# Patient Record
Sex: FEMALE | Race: WHITE | Employment: PART TIME | URBAN - METROPOLITAN AREA
[De-identification: names, ages, dates, MRNs, and addresses within clinical notes are randomized per-mention and may not be internally consistent; named-entity substitution may affect disease eponyms.]

---

## 2017-11-03 ENCOUNTER — OFFICE VISIT (OUTPATIENT)
Dept: FAMILY MEDICINE CLINIC | Facility: CLINIC | Age: 47
End: 2017-11-03

## 2017-11-03 ENCOUNTER — LAB ENCOUNTER (OUTPATIENT)
Dept: LAB | Age: 47
End: 2017-11-03
Attending: FAMILY MEDICINE
Payer: COMMERCIAL

## 2017-11-03 VITALS
TEMPERATURE: 98 F | DIASTOLIC BLOOD PRESSURE: 76 MMHG | OXYGEN SATURATION: 98 % | SYSTOLIC BLOOD PRESSURE: 110 MMHG | HEIGHT: 63 IN | HEART RATE: 83 BPM | WEIGHT: 150 LBS | BODY MASS INDEX: 26.58 KG/M2

## 2017-11-03 DIAGNOSIS — Z00.00 ROUTINE MEDICAL EXAM: Primary | ICD-10-CM

## 2017-11-03 DIAGNOSIS — Z00.00 ROUTINE MEDICAL EXAM: ICD-10-CM

## 2017-11-03 DIAGNOSIS — K21.9 GASTROESOPHAGEAL REFLUX DISEASE WITHOUT ESOPHAGITIS: ICD-10-CM

## 2017-11-03 DIAGNOSIS — L70.0 ACNE VULGARIS: ICD-10-CM

## 2017-11-03 PROCEDURE — 85025 COMPLETE CBC W/AUTO DIFF WBC: CPT

## 2017-11-03 PROCEDURE — 90471 IMMUNIZATION ADMIN: CPT | Performed by: FAMILY MEDICINE

## 2017-11-03 PROCEDURE — 84443 ASSAY THYROID STIM HORMONE: CPT

## 2017-11-03 PROCEDURE — 99396 PREV VISIT EST AGE 40-64: CPT | Performed by: FAMILY MEDICINE

## 2017-11-03 PROCEDURE — 90715 TDAP VACCINE 7 YRS/> IM: CPT | Performed by: FAMILY MEDICINE

## 2017-11-03 PROCEDURE — 80061 LIPID PANEL: CPT

## 2017-11-03 PROCEDURE — 80053 COMPREHEN METABOLIC PANEL: CPT

## 2017-11-03 PROCEDURE — 36415 COLL VENOUS BLD VENIPUNCTURE: CPT

## 2017-11-03 RX ORDER — DOXYCYCLINE HYCLATE 50 MG/1
1 TABLET, FILM COATED ORAL 2 TIMES DAILY
Qty: 180 TABLET | Refills: 1 | Status: SHIPPED | OUTPATIENT
Start: 2017-11-03 | End: 2018-06-21 | Stop reason: ALTCHOICE

## 2017-11-03 RX ORDER — ESOMEPRAZOLE MAGNESIUM 20 MG/1
20 FOR SUSPENSION ORAL
Qty: 90 EACH | Refills: 1 | Status: SHIPPED | OUTPATIENT
Start: 2017-11-03 | End: 2017-11-08 | Stop reason: CLARIF

## 2017-11-03 RX ORDER — CLINDAMYCIN PHOSPHATE 10 MG/G
1 GEL TOPICAL 2 TIMES DAILY
Qty: 60 G | Refills: 2 | Status: SHIPPED | OUTPATIENT
Start: 2017-11-03 | End: 2018-06-21 | Stop reason: ALTCHOICE

## 2017-11-03 NOTE — H&P
HPI:   Patient presents with:  Physical  Acne  Gastro-esophageal Reflux      Mark Dorado is a 49year old female who presents for a complete physical exam without pap/gyne exam.     Patient has new complaints of:  · GERD, patient taking Nexium 20 mg Outpatient Prescriptions:  Clindamycin Phosphate 1 % External Gel Apply 1 Application topically 2 (two) times daily.  Disp: 60 g Rfl: 2   Esomeprazole Magnesium 20 MG Oral Powd Pack Take 20 mg by mouth every morning before breakfast. Disp: 90 each Rfl: 1 REVIEW OF SYSTEMS:   Pertinent positives and negatives noted in the the HPI.  Specifically:  GEN:  No fever or fatigue, no weight gain/loss  HEAD:  No headaches, no dizziness  EYES:  No vision change or complaints  EARS:  No hearing loss, no ear p LE, no tremor B UE, DTR's 2/4 B LE, gait WNL     ASSESSMENT AND PLAN:   1. Ellouise Sandhoff is a 49year old female who presents for a complete physical exam.  She is in good health.   Her weight is stable, Estimated body mass index is 26.57 kg/m² as calcul Dispense: 180 tablet; Refill: 01    3.  Gastroesophageal reflux disease without esophagitis  Improved, will continue present management, Nexium 20 mg once a day as needed only, patient to moderate diet, follow-up 6-12 months  - Esomeprazole Magnesium 20 MG

## 2017-11-06 DIAGNOSIS — R79.89 ABNORMAL BILIRUBIN TEST: Primary | ICD-10-CM

## 2017-11-07 ENCOUNTER — TELEPHONE (OUTPATIENT)
Dept: FAMILY MEDICINE CLINIC | Facility: CLINIC | Age: 47
End: 2017-11-07

## 2017-11-08 PROBLEM — R23.8 INTRINSIC AGING OF FACIAL SKIN: Status: ACTIVE | Noted: 2017-11-08

## 2017-11-08 RX ORDER — OMEPRAZOLE 40 MG/1
40 CAPSULE, DELAYED RELEASE ORAL DAILY
Qty: 90 CAPSULE | Refills: 0 | Status: SHIPPED | OUTPATIENT
Start: 2017-11-08 | End: 2018-12-05 | Stop reason: ALTCHOICE

## 2017-11-08 NOTE — TELEPHONE ENCOUNTER
Dr koch it looks like that you sent Esomeprazole Magnesium 20 MG Oral Powd Pack it was not pills that why the insurance didn't coved.  Can you sent Esomeprazole tablet per pt

## 2017-11-10 ENCOUNTER — OFFICE VISIT (OUTPATIENT)
Dept: FAMILY MEDICINE CLINIC | Facility: CLINIC | Age: 47
End: 2017-11-10

## 2017-11-10 VITALS
HEIGHT: 63 IN | HEART RATE: 73 BPM | SYSTOLIC BLOOD PRESSURE: 114 MMHG | BODY MASS INDEX: 26.58 KG/M2 | DIASTOLIC BLOOD PRESSURE: 72 MMHG | WEIGHT: 150 LBS | OXYGEN SATURATION: 98 % | TEMPERATURE: 99 F

## 2017-11-10 DIAGNOSIS — R23.8 INTRINSIC AGING OF FACIAL SKIN: Primary | ICD-10-CM

## 2017-11-10 RX ORDER — DOXYCYCLINE HYCLATE 50 MG/1
CAPSULE ORAL
COMMUNITY
Start: 2017-11-06 | End: 2018-06-21 | Stop reason: ALTCHOICE

## 2017-11-10 NOTE — PROCEDURES
HPI:   Patient presents with:  Derm Problem: botox and  Jonathan is a 49year old female who presents for Cosmetic facila Botox and Microdermabrasion (#1) for normal skin aging effects.     She is without other new complaints REVIEW OF SYSTEMS:   Pertinent positives and negatives noted in the the HPI.  Specifically:  GEN:  No fever or fatigue  HEAD:  No headaches, no dizziness  EYES:  No vision change or complaints  EARS:  No hearing loss, no ear pain  MOUTH/THROAT: to the plan.)     Additional Assessment and Plan:  1. Intrinsic aging of facial skin  See above      The patient indicates understanding of the above recommendations and agrees to the above plan.   Follow up: as above    No orders of the defined types were

## 2017-12-18 PROCEDURE — 88175 CYTOPATH C/V AUTO FLUID REDO: CPT | Performed by: OBSTETRICS & GYNECOLOGY

## 2018-03-23 ENCOUNTER — OFFICE VISIT (OUTPATIENT)
Dept: FAMILY MEDICINE CLINIC | Facility: CLINIC | Age: 48
End: 2018-03-23

## 2018-03-23 ENCOUNTER — MED REC SCAN ONLY (OUTPATIENT)
Dept: FAMILY MEDICINE CLINIC | Facility: CLINIC | Age: 48
End: 2018-03-23

## 2018-03-23 VITALS
OXYGEN SATURATION: 96 % | BODY MASS INDEX: 28.35 KG/M2 | DIASTOLIC BLOOD PRESSURE: 70 MMHG | TEMPERATURE: 98 F | HEART RATE: 105 BPM | SYSTOLIC BLOOD PRESSURE: 110 MMHG | HEIGHT: 63 IN | WEIGHT: 160 LBS

## 2018-03-23 DIAGNOSIS — R23.8 INTRINSIC AGING OF FACIAL SKIN: Primary | ICD-10-CM

## 2018-03-23 NOTE — PROCEDURES
HPI:   Patient presents with:  Derm Problem: botox & Microderm. #2       Wilton Burns is a 51year old female who presents for cosmetic facial BOTOX injections for normal skin aging effects. She is without other complaints and concerns.   See MO meza to visit. No past medical history on file. No Known Allergies    REVIEW OF SYSTEMS:   Pertinent positives and negatives noted in the the HPI.  Specifically:  GEN:  No fever or fatigue  HEAD:  No headaches, no dizziness  EYES:  No vision change or complai given to patient. Recommend to return to clinic in 3-4 months or as desired.   [employer] skin care products purchased today: none    (The patient's consent obtained: she indicates understanding of the risks and agrees to the plan above.)     Additional A

## 2018-09-05 ENCOUNTER — OFFICE VISIT (OUTPATIENT)
Dept: FAMILY MEDICINE CLINIC | Facility: CLINIC | Age: 48
End: 2018-09-05

## 2018-09-05 ENCOUNTER — OFFICE VISIT (OUTPATIENT)
Dept: FAMILY MEDICINE CLINIC | Facility: CLINIC | Age: 48
End: 2018-09-05
Payer: COMMERCIAL

## 2018-09-05 VITALS
WEIGHT: 155 LBS | BODY MASS INDEX: 27.46 KG/M2 | HEIGHT: 63 IN | DIASTOLIC BLOOD PRESSURE: 78 MMHG | OXYGEN SATURATION: 99 % | SYSTOLIC BLOOD PRESSURE: 112 MMHG | RESPIRATION RATE: 16 BRPM | HEART RATE: 66 BPM

## 2018-09-05 VITALS
BODY MASS INDEX: 27.46 KG/M2 | HEIGHT: 63 IN | WEIGHT: 155 LBS | DIASTOLIC BLOOD PRESSURE: 78 MMHG | SYSTOLIC BLOOD PRESSURE: 112 MMHG | HEART RATE: 66 BPM

## 2018-09-05 DIAGNOSIS — L57.0 AK (ACTINIC KERATOSIS): Primary | ICD-10-CM
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
## 2018-09-05 DIAGNOSIS — R23.8 INTRINSIC AGING OF FACIAL SKIN: ICD-10-CM

## 2018-09-05 PROCEDURE — 17000 DESTRUCT PREMALG LESION: CPT | Performed by: FAMILY MEDICINE

## 2018-09-05 PROCEDURE — 17003 DESTRUCT PREMALG LES 2-14: CPT | Performed by: FAMILY MEDICINE

## 2018-09-05 RX ORDER — FLUOROURACIL 50 MG/G
1 CREAM TOPICAL 2 TIMES DAILY
Qty: 1 TUBE | Refills: 1 | Status: SHIPPED | OUTPATIENT
Start: 2018-09-05 | End: 2020-06-25 | Stop reason: CLARIF

## 2018-09-05 NOTE — PROCEDURES
HPI:  Patient presents with:  Derm Problem: Cryo-face skin lesions      Linda Regan is a 51year old female who presents for skin lesion(s). Located on face. Has had for years  It is described as worsening, not healing, flaking.  The probable cause and upper lip bilaterally,  skin color wnl, otherwise normal signs of skin aging, no atypical nevi noted  HEENT: atraumatic, normocephalic, EYES:PERRLA, EOMI ,conjunctiva are clear, no discharge  NECK: supple  EXT: no edema  MS: grossly NL B UE/LE, no join Apply 1 Application topically 2 (two) times daily.  As needed to AA until desire affect acheived           Imaging & Consults:  None    Kerry Babin MD

## 2018-09-05 NOTE — PROCEDURES
HPI:   Patient presents with:  Derm Problem: Microdermabrasion #3, BOTOX       Dain Desai is a 51year old female who presents for cosmetic facial Microdermabrasion & BOTOX injections for normal skin aging effects.     She is without other complaints normocephalic  EYES:PERRLA, EOMI, conjunctiva are clear, no discharge  NECK: supple  EXT:no CCE  MS: grossly normal  B UE and LE  NEURO: A&O x 3, CN II-XII wnl, motor and sensory grossly wnl B UE and LE   PSYCH: mood and affect WNL,  speech clear and congr

## 2018-12-05 ENCOUNTER — OFFICE VISIT (OUTPATIENT)
Dept: FAMILY MEDICINE CLINIC | Facility: CLINIC | Age: 48
End: 2018-12-05

## 2018-12-05 ENCOUNTER — OFFICE VISIT (OUTPATIENT)
Dept: FAMILY MEDICINE CLINIC | Facility: CLINIC | Age: 48
End: 2018-12-05
Payer: COMMERCIAL

## 2018-12-05 VITALS
OXYGEN SATURATION: 98 % | HEART RATE: 70 BPM | SYSTOLIC BLOOD PRESSURE: 110 MMHG | WEIGHT: 155 LBS | BODY MASS INDEX: 27.46 KG/M2 | HEIGHT: 63 IN | DIASTOLIC BLOOD PRESSURE: 64 MMHG | RESPIRATION RATE: 17 BRPM

## 2018-12-05 VITALS
DIASTOLIC BLOOD PRESSURE: 64 MMHG | RESPIRATION RATE: 17 BRPM | OXYGEN SATURATION: 98 % | BODY MASS INDEX: 27.46 KG/M2 | HEIGHT: 63 IN | SYSTOLIC BLOOD PRESSURE: 110 MMHG | HEART RATE: 70 BPM | WEIGHT: 155 LBS
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
## 2018-12-05 DIAGNOSIS — L57.0 AK (ACTINIC KERATOSIS): Primary | ICD-10-CM

## 2018-12-05 DIAGNOSIS — R23.8 INTRINSIC AGING OF FACIAL SKIN: Primary | ICD-10-CM

## 2018-12-05 PROCEDURE — 17000 DESTRUCT PREMALG LESION: CPT | Performed by: FAMILY MEDICINE

## 2018-12-05 PROCEDURE — 17003 DESTRUCT PREMALG LES 2-14: CPT | Performed by: FAMILY MEDICINE

## 2018-12-05 RX ORDER — CETIRIZINE HYDROCHLORIDE 10 MG/1
10 TABLET ORAL DAILY
COMMUNITY
End: 2019-04-17 | Stop reason: ALTCHOICE

## 2018-12-06 NOTE — PROCEDURES
HPI:   Patient presents with:  Procedure: Microderm #4, Botox       Priyanka Herron is a 51year old female who presents for cosmetic facial microdermabrasion #4  And BOTOX injections for normal skin aging effects.     She is without other complaints and c wnl, Normal signs of skin aging  HEENT: atraumatic, normocephalic  EYES:PERRLA, EOMI, conjunctiva are clear, no discharge  NECK: supple  EXT:no CCE  MS: grossly normal  B UE and LE  NEURO: A&O x 3, CN II-XII wnl, motor and sensory grossly wnl B UE and LE

## 2018-12-06 NOTE — PROCEDURES
HPI:  Patient presents with:  Derm Problem  Follow - Up      Griselda Vergara is a 51year old female who presents for skin lesion(s). Located on face: . Has had for months-year  It is described as ***. The probable cause is unknown***.  Has tried *** wi and overall skin color wnl  HEENT: atraumatic, normocephalic, EYES:PERRLA, EOMI ,conjunctiva are clear, no discharge  NECK: supple***  EXT: no edema  MS: grossly NL B UE/LE, no joint deformities, FROM B UE/LE  PSYCH: mood and affect WNL, speech clear, mood

## 2018-12-09 NOTE — PROCEDURES
HPI:  Patient presents with:  Derm Problem  Follow - Up      Lilly Naidu is a 51year old female who presents for skin lesion(s). Located on face.  Has had for years  It is described as overall improving but a few persistent despite cryotherapy, the well developed, well nourished, female in no apparent distress  SKIN: Few scattered slightly raised pink dry flaky patches especially R temple area (2), R lat forehead (1) L maxillary (3) and infraorbital area (1),  skin color wnl, otherwise normal signs o clinic if signs of infection or persistent bleeding      No orders of the defined types were placed in this encounter.       Meds & Refills for this Visit:  Requested Prescriptions      No prescriptions requested or ordered in this encounter       Imaging &

## 2019-01-15 ENCOUNTER — TELEPHONE (OUTPATIENT)
Dept: FAMILY MEDICINE CLINIC | Facility: CLINIC | Age: 49
End: 2019-01-15

## 2019-01-16 NOTE — TELEPHONE ENCOUNTER
Call pt-LHK the Skinscripts order is in and the Hydrating moisturizer is available, if she wants anything else we can put it aside too (check w Alicia Chowdhury as someone paid for Hydrating moisturizer and ageless serum-both are put aside in Michelle's office, was it T

## 2019-04-17 ENCOUNTER — LAB ENCOUNTER (OUTPATIENT)
Dept: LAB | Age: 49
End: 2019-04-17
Attending: FAMILY MEDICINE
Payer: COMMERCIAL

## 2019-04-17 ENCOUNTER — OFFICE VISIT (OUTPATIENT)
Dept: FAMILY MEDICINE CLINIC | Facility: CLINIC | Age: 49
End: 2019-04-17
Payer: COMMERCIAL

## 2019-04-17 ENCOUNTER — OFFICE VISIT (OUTPATIENT)
Dept: FAMILY MEDICINE CLINIC | Facility: CLINIC | Age: 49
End: 2019-04-17

## 2019-04-17 VITALS
TEMPERATURE: 99 F | DIASTOLIC BLOOD PRESSURE: 72 MMHG | OXYGEN SATURATION: 98 % | SYSTOLIC BLOOD PRESSURE: 110 MMHG | BODY MASS INDEX: 27.65 KG/M2 | RESPIRATION RATE: 17 BRPM | HEART RATE: 82 BPM | WEIGHT: 158 LBS | HEIGHT: 63.5 IN
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
## 2019-04-17 DIAGNOSIS — K59.00 CONSTIPATION, UNSPECIFIED CONSTIPATION TYPE: ICD-10-CM

## 2019-04-17 DIAGNOSIS — L57.0 AK (ACTINIC KERATOSIS): ICD-10-CM

## 2019-04-17 DIAGNOSIS — Z00.00 ROUTINE MEDICAL EXAM: Primary | ICD-10-CM

## 2019-04-17 DIAGNOSIS — R68.89 FORGETFULNESS: ICD-10-CM

## 2019-04-17 DIAGNOSIS — H93.13 TINNITUS OF BOTH EARS: ICD-10-CM

## 2019-04-17 DIAGNOSIS — Z13.820 SCREENING FOR OSTEOPOROSIS: ICD-10-CM

## 2019-04-17 DIAGNOSIS — Z00.00 ROUTINE MEDICAL EXAM: ICD-10-CM

## 2019-04-17 DIAGNOSIS — R23.8 INTRINSIC AGING OF FACIAL SKIN: Primary | ICD-10-CM

## 2019-04-17 DIAGNOSIS — Z12.11 SCREENING FOR COLON CANCER: ICD-10-CM

## 2019-04-17 PROCEDURE — 84443 ASSAY THYROID STIM HORMONE: CPT

## 2019-04-17 PROCEDURE — 17003 DESTRUCT PREMALG LES 2-14: CPT | Performed by: FAMILY MEDICINE

## 2019-04-17 PROCEDURE — 99396 PREV VISIT EST AGE 40-64: CPT | Performed by: FAMILY MEDICINE

## 2019-04-17 PROCEDURE — 17000 DESTRUCT PREMALG LESION: CPT | Performed by: FAMILY MEDICINE

## 2019-04-17 PROCEDURE — 80053 COMPREHEN METABOLIC PANEL: CPT

## 2019-04-17 PROCEDURE — 36415 COLL VENOUS BLD VENIPUNCTURE: CPT

## 2019-04-17 PROCEDURE — 99213 OFFICE O/P EST LOW 20 MIN: CPT | Performed by: FAMILY MEDICINE

## 2019-04-17 PROCEDURE — 80061 LIPID PANEL: CPT

## 2019-04-17 PROCEDURE — 84436 ASSAY OF TOTAL THYROXINE: CPT

## 2019-04-17 PROCEDURE — 85025 COMPLETE CBC W/AUTO DIFF WBC: CPT

## 2019-04-18 NOTE — PROCEDURES
ASSESSMENT/PLAN:  Fabiola Vasquez is a 47year old female who presents for treatment of skin lesion    1. AK (actinic keratosis)  Cryo performed as below  F/u 3-4 months or sooner prn    Procedure: Destruction of skin lesion(s): Rosebud Galea    Locations:  Face, 5 to

## 2019-04-18 NOTE — H&P
HPI:   Patient presents with:  Physical  Memory Loss  Tinnitus  Derm Problem: ak face  Constipation      Erin Cuenca is a 47year old female who presents for a complete physical exam without pap/gyne exam.     Patient has new complaints of:  Constip 04/17/2019 01:00 PM    BILT 1.2 04/17/2019 01:00 PM    TSH 2.520 04/17/2019 01:00 PM        Lab Results   Component Value Date/Time    CHOLEST 167 04/17/2019 01:00 PM    HDL 45 04/17/2019 01:00 PM    TRIG 80 04/17/2019 01:00 PM     (H) 04/17/2019 01 gain/loss  HEAD:  No headaches, no dizziness  EYES:  No vision change or complaints  EARS:  No hearing loss, no ear pain  MOUTH/THROAT:  No sore throat or dental problems, no oral lesions  HEART:  No chest pain or palpitations  LUNG:  No SOB, cough or whee old female who presents for a complete physical exam.  She is in good health. Her weight is stable, Estimated body mass index is 27.55 kg/m² as calculated from the following:    Height as of an earlier encounter on 4/17/19: 63.5\".     Weight as of an lesly no txt, rec second opinion w Neuro as well  - NEURO - INTERNAL    7. AK (actinic keratosis)-face, recurrent  See procedure note      The patient indicates understanding of the above recommendations and agrees to the above plan.   Follow up: as above    Orde

## 2019-04-18 NOTE — PROCEDURES
HPI:   Patient presents with:  Derm Problem: Microdermabrasion #2, botox       Virgil Ellington is a 47year old female who presents for cosmetic facial microdermabrasion #2 & BOTOX injections for normal skin aging effects.     She is without other complain discharge  NECK: supple  EXT:no CCE  MS: grossly normal  B UE and LE  NEURO: A&O x 3, CN II-XII wnl, motor and sensory grossly wnl B UE and LE   PSYCH: mood and affect WNL,  speech clear and congruent with thoughts    ASSESSMENT AND PLAN:   1.  Patient is a

## 2019-05-17 ENCOUNTER — OFFICE VISIT (OUTPATIENT)
Dept: FAMILY MEDICINE CLINIC | Facility: CLINIC | Age: 49
End: 2019-05-17
Payer: COMMERCIAL

## 2019-05-17 VITALS
HEART RATE: 78 BPM | HEIGHT: 63 IN | WEIGHT: 160 LBS | SYSTOLIC BLOOD PRESSURE: 120 MMHG | DIASTOLIC BLOOD PRESSURE: 70 MMHG | RESPIRATION RATE: 17 BRPM | BODY MASS INDEX: 28.35 KG/M2 | TEMPERATURE: 98 F

## 2019-05-17 DIAGNOSIS — R10.30 LOWER ABDOMINAL PAIN: Primary | ICD-10-CM

## 2019-05-17 DIAGNOSIS — M54.50 ACUTE BILATERAL LOW BACK PAIN WITHOUT SCIATICA: ICD-10-CM

## 2019-05-17 DIAGNOSIS — R35.0 URINARY FREQUENCY: ICD-10-CM

## 2019-05-17 DIAGNOSIS — Z84.1 FAMILY HISTORY OF KIDNEY STONE: ICD-10-CM

## 2019-05-17 DIAGNOSIS — Z87.42 HISTORY OF ENDOMETRIOSIS: ICD-10-CM

## 2019-05-17 PROCEDURE — 99214 OFFICE O/P EST MOD 30 MIN: CPT | Performed by: FAMILY MEDICINE

## 2019-05-17 PROCEDURE — 87086 URINE CULTURE/COLONY COUNT: CPT | Performed by: FAMILY MEDICINE

## 2019-05-17 PROCEDURE — 81003 URINALYSIS AUTO W/O SCOPE: CPT | Performed by: FAMILY MEDICINE

## 2019-05-17 NOTE — PROGRESS NOTES
HPI:   Patient presents with:  Abdominal Pain: ant pevlic pressure, started 2 weeks ago, precribed macrobid 5/7/2019 x 5 days  Low Back Pain: family h/o kidney stones  Urinary Frequency      Cheli Hutchison is a 47year old female who presents with comp Apply 1 Application topically 2 (two) times daily. As needed to AA until desire affect acheived Disp: 1 Tube Rfl: 1   Fluticasone Propionate (FLONASE) 50 MCG/ACT Nasal Suspension by Each Nare route daily. Disp:  Rfl:       History reviewed.  No pertinent pa atraumatic, normocephalic, nose and throat are clear; EYES: PERRLA, EOMI, conjunctiva are clear, no discharge  NECK: supple, no LAD, no TM  LUNGS: good BS B, clear to auscultation B, no wheezing and no rhonchi B   CARDIO: RRR without murmur, NL S1 S2, no S Cefaclor 500 MG Oral Cap; Take 1 capsule (500 mg total) by mouth 2 (two) times daily. X 7-10 days  Dispense: 20 capsule; Refill: 0  - US PELVIS (TRANSABDOMINAL AND TRANSVAGINAL) (CPT=76856/05186);  Future  - URINE CULTURE, ROUTINE  - US ABDOMEN COMPLETE (CP

## 2019-05-21 ENCOUNTER — HOSPITAL ENCOUNTER (OUTPATIENT)
Dept: ULTRASOUND IMAGING | Age: 49
Discharge: HOME | End: 2019-05-21
Attending: FAMILY MEDICINE
Payer: COMMERCIAL

## 2019-05-21 DIAGNOSIS — R35.0 URINARY FREQUENCY: ICD-10-CM

## 2019-05-21 DIAGNOSIS — Z87.42 HISTORY OF ENDOMETRIOSIS: ICD-10-CM

## 2019-05-21 DIAGNOSIS — M54.50 ACUTE BILATERAL LOW BACK PAIN WITHOUT SCIATICA: ICD-10-CM

## 2019-05-21 DIAGNOSIS — R10.30 LOWER ABDOMINAL PAIN: ICD-10-CM

## 2019-05-21 PROCEDURE — 76830 TRANSVAGINAL US NON-OB: CPT | Performed by: FAMILY MEDICINE

## 2019-05-21 PROCEDURE — 76856 US EXAM PELVIC COMPLETE: CPT | Performed by: FAMILY MEDICINE

## 2019-05-24 ENCOUNTER — HOSPITAL ENCOUNTER (OUTPATIENT)
Dept: ULTRASOUND IMAGING | Age: 49
Discharge: HOME | End: 2019-05-24
Attending: FAMILY MEDICINE
Payer: COMMERCIAL

## 2019-05-24 DIAGNOSIS — R10.30 LOWER ABDOMINAL PAIN: ICD-10-CM

## 2019-05-24 DIAGNOSIS — Z84.1 FAMILY HISTORY OF KIDNEY STONE: ICD-10-CM

## 2019-05-24 DIAGNOSIS — M54.50 ACUTE BILATERAL LOW BACK PAIN WITHOUT SCIATICA: ICD-10-CM

## 2019-05-24 DIAGNOSIS — R35.0 URINARY FREQUENCY: ICD-10-CM

## 2019-05-24 PROCEDURE — 76700 US EXAM ABDOM COMPLETE: CPT | Performed by: FAMILY MEDICINE

## 2019-05-28 ENCOUNTER — TELEPHONE (OUTPATIENT)
Dept: FAMILY MEDICINE CLINIC | Facility: CLINIC | Age: 49
End: 2019-05-28

## 2019-05-28 NOTE — TELEPHONE ENCOUNTER
Pain in upper left back and left ribs worse than before should she start antiobiotics that Dr Richard Rivero prescribed.

## 2019-05-28 NOTE — TELEPHONE ENCOUNTER
Spoke to pt, per US result notes pt is to return in 1-2 weeks if no improvement. Pt instructed to schedule appt for f/u; appt scheduled 5/29/19 with ANGELINA. Pt states she p/u Cefaclor from pharm today, which she states was being held if urine cx showed anything abnormal. Pt wanting to know if she should start the Cefaclor or wait until f/u tomorrow. OK to send [employer] message with answer.

## 2019-05-29 ENCOUNTER — HOSPITAL ENCOUNTER (OUTPATIENT)
Dept: GENERAL RADIOLOGY | Age: 49
Discharge: HOME | End: 2019-05-29
Attending: FAMILY MEDICINE
Payer: COMMERCIAL

## 2019-05-29 ENCOUNTER — OFFICE VISIT (OUTPATIENT)
Dept: FAMILY MEDICINE CLINIC | Facility: CLINIC | Age: 49
End: 2019-05-29
Payer: COMMERCIAL

## 2019-05-29 VITALS
TEMPERATURE: 99 F | RESPIRATION RATE: 17 BRPM | HEART RATE: 68 BPM | SYSTOLIC BLOOD PRESSURE: 112 MMHG | HEIGHT: 63 IN | OXYGEN SATURATION: 98 % | WEIGHT: 160 LBS | BODY MASS INDEX: 28.35 KG/M2 | DIASTOLIC BLOOD PRESSURE: 78 MMHG

## 2019-05-29 DIAGNOSIS — R10.12 LEFT UPPER QUADRANT PAIN: ICD-10-CM

## 2019-05-29 DIAGNOSIS — Z87.440 HISTORY OF UTI: ICD-10-CM

## 2019-05-29 DIAGNOSIS — K59.09 OTHER CONSTIPATION: ICD-10-CM

## 2019-05-29 DIAGNOSIS — R06.02 SHORTNESS OF BREATH: ICD-10-CM

## 2019-05-29 DIAGNOSIS — M54.6 ACUTE LEFT-SIDED THORACIC BACK PAIN: Primary | ICD-10-CM

## 2019-05-29 DIAGNOSIS — M54.6 ACUTE LEFT-SIDED THORACIC BACK PAIN: ICD-10-CM

## 2019-05-29 PROCEDURE — 72072 X-RAY EXAM THORAC SPINE 3VWS: CPT | Performed by: FAMILY MEDICINE

## 2019-05-29 PROCEDURE — 74018 RADEX ABDOMEN 1 VIEW: CPT | Performed by: FAMILY MEDICINE

## 2019-05-29 PROCEDURE — 99214 OFFICE O/P EST MOD 30 MIN: CPT | Performed by: FAMILY MEDICINE

## 2019-05-29 NOTE — PROGRESS NOTES
HPI:   Patient presents with:   Follow - Up: Pelvic and mid-lower L side pain, lov 5/17/19 for this problem, no improvement  Breathing Problem: recent SOB, today's peak flows: 330, 340, 350      Georgia Raz is a 47year old female who presents with c (two) times daily. As needed to AA until desire affect acheived Disp: 1 Tube Rfl: 1   Fluticasone Propionate (FLONASE) 50 MCG/ACT Nasal Suspension by Each Nare route daily. Disp:  Rfl:       History reviewed. No pertinent past medical history.    Past Surgi normocephalic, nose and throat are clear; EYES: PERRLA, EOMI, conjunctiva are clear, no discharge  NECK: supple, no LAD, no TM  LUNGS: good BS B, clear to auscultation B, no wheezing and no rhonchi B   CARDIO: RRR without murmur, NL S1 S2, no S3 S4  EXT: n without other UTI symptoms, will follow-up as above and as needed      The patient indicates understanding of the above recommendations and agrees to the above plan. Follow up: as above    No orders of the defined types were placed in this encounter.

## 2019-05-29 NOTE — TELEPHONE ENCOUNTER
Called patient, advised to start abx now, try to take 3 doses before apt tomorrow. Patient verbalizes understanding.

## 2019-06-17 ENCOUNTER — LAB ENCOUNTER (OUTPATIENT)
Dept: LAB | Facility: REFERENCE LAB | Age: 49
End: 2019-06-17
Attending: FAMILY MEDICINE
Payer: COMMERCIAL

## 2019-06-17 ENCOUNTER — OFFICE VISIT (OUTPATIENT)
Dept: FAMILY MEDICINE CLINIC | Facility: CLINIC | Age: 49
End: 2019-06-17
Payer: COMMERCIAL

## 2019-06-17 VITALS
OXYGEN SATURATION: 99 % | BODY MASS INDEX: 28 KG/M2 | SYSTOLIC BLOOD PRESSURE: 100 MMHG | RESPIRATION RATE: 18 BRPM | HEIGHT: 63 IN | WEIGHT: 158 LBS | TEMPERATURE: 98 F | HEART RATE: 99 BPM | DIASTOLIC BLOOD PRESSURE: 70 MMHG

## 2019-06-17 DIAGNOSIS — R10.9 ABDOMINAL PAIN, UNSPECIFIED ABDOMINAL LOCATION: ICD-10-CM

## 2019-06-17 DIAGNOSIS — R10.9 ABDOMINAL PAIN, UNSPECIFIED ABDOMINAL LOCATION: Primary | ICD-10-CM

## 2019-06-17 DIAGNOSIS — K59.00 CONSTIPATION, UNSPECIFIED CONSTIPATION TYPE: ICD-10-CM

## 2019-06-17 DIAGNOSIS — R12 CHRONIC HEARTBURN: ICD-10-CM

## 2019-06-17 PROCEDURE — 83690 ASSAY OF LIPASE: CPT

## 2019-06-17 PROCEDURE — 84443 ASSAY THYROID STIM HORMONE: CPT

## 2019-06-17 PROCEDURE — 80053 COMPREHEN METABOLIC PANEL: CPT

## 2019-06-17 PROCEDURE — 36415 COLL VENOUS BLD VENIPUNCTURE: CPT

## 2019-06-17 PROCEDURE — 99214 OFFICE O/P EST MOD 30 MIN: CPT | Performed by: FAMILY MEDICINE

## 2019-06-17 PROCEDURE — 85025 COMPLETE CBC W/AUTO DIFF WBC: CPT

## 2019-06-17 PROCEDURE — 87086 URINE CULTURE/COLONY COUNT: CPT

## 2019-06-17 PROCEDURE — 81001 URINALYSIS AUTO W/SCOPE: CPT

## 2019-06-17 PROCEDURE — 81015 MICROSCOPIC EXAM OF URINE: CPT

## 2019-06-17 NOTE — PATIENT INSTRUCTIONS
PLAN:  -check labs  -check urine  -CT abd/pelvis ordered  -stay well-hydrated  -low oxalate diet  -f/u after above completed/as needed

## 2019-06-17 NOTE — PROGRESS NOTES
HPI: 47year old female presents c/o Patient presents with:  Back Pain: left side, Dr sweet patient and has done ultrasound would like to discuss options, still feeling pain    VSS. C/o L flank pain below ribs and right under rib area. S/s x 3 weeks.  P fatigue, recent weight changes, rashes, lesions.  Further review is otherwise negative.     06/17/19  1245   BP: 100/70   Pulse: 99   Resp: 18   Temp: 98.1 °F (36.7 °C)   TempSrc: Oral   SpO2: 99%   Weight: 158 lb   Height: 63\"       Wt Readings from Last keratosis)     Excess sun exposure      PLAN:  -check labs  -check urine  -CT abd/pelvis ordered  -stay well-hydrated  -low oxalate diet  -f/u after above completed/as needed

## 2019-06-19 ENCOUNTER — TELEPHONE (OUTPATIENT)
Dept: FAMILY MEDICINE CLINIC | Facility: CLINIC | Age: 49
End: 2019-06-19

## 2019-06-19 ENCOUNTER — HOSPITAL ENCOUNTER (OUTPATIENT)
Dept: CT IMAGING | Age: 49
Discharge: HOME | End: 2019-06-19
Attending: FAMILY MEDICINE
Payer: COMMERCIAL

## 2019-06-19 DIAGNOSIS — R10.9 ABDOMINAL PAIN, UNSPECIFIED ABDOMINAL LOCATION: ICD-10-CM

## 2019-06-19 PROCEDURE — 74176 CT ABD & PELVIS W/O CONTRAST: CPT | Performed by: FAMILY MEDICINE

## 2019-06-19 NOTE — TELEPHONE ENCOUNTER
Notes recorded by Eligio Godinez DO on 6/19/2019 at 12:10 PM CDT  All labs/urine analysis are unremarkable     Holly Chaparro and gave her Dr Casillas's results. Patient acknowledged and understood.

## 2019-06-19 NOTE — TELEPHONE ENCOUNTER
Discussed recent labs/UA-all normal.    Discussed CT abd/pelvis: a few tiny nonobstructing L kidney stones are present-unlikely cause of symptoms (constant/dull); iliac artery atherosclerosis noted.      We discussed r/o MALS-however unlikely/rare, with a f

## 2019-06-20 ENCOUNTER — TELEPHONE (OUTPATIENT)
Dept: FAMILY MEDICINE CLINIC | Facility: CLINIC | Age: 49
End: 2019-06-20

## 2019-07-13 ENCOUNTER — HOSPITAL ENCOUNTER (OUTPATIENT)
Dept: ULTRASOUND IMAGING | Facility: HOSPITAL | Age: 49
Discharge: HOME | End: 2019-07-13
Attending: FAMILY MEDICINE
Payer: COMMERCIAL

## 2019-07-13 DIAGNOSIS — R93.5 ABNORMAL CT OF THE ABDOMEN: ICD-10-CM

## 2019-07-13 DIAGNOSIS — R10.9 ABDOMINAL PAIN, UNSPECIFIED ABDOMINAL LOCATION: ICD-10-CM

## 2019-07-13 PROCEDURE — 93975 VASCULAR STUDY: CPT | Performed by: FAMILY MEDICINE

## 2019-07-13 PROCEDURE — 76700 US EXAM ABDOM COMPLETE: CPT | Performed by: FAMILY MEDICINE

## 2019-07-15 ENCOUNTER — TELEPHONE (OUTPATIENT)
Dept: FAMILY MEDICINE CLINIC | Facility: CLINIC | Age: 49
End: 2019-07-15

## 2019-07-15 DIAGNOSIS — R10.9 ABDOMINAL PAIN, UNSPECIFIED ABDOMINAL LOCATION: Primary | ICD-10-CM

## 2019-07-15 NOTE — TELEPHONE ENCOUNTER
Spoke to pt, she states she is not interested in PT at this time and feels it is more GI related. Pt states she did speak with Dr. Doroteo Ramirez regarding a colonoscopy and decided to have the Cologuard done.  Pt states she was told it was to be ordered but never h

## 2019-07-15 NOTE — TELEPHONE ENCOUNTER
Ok-her symptoms are suspicious for musculoskeletal origin. Patient amenable to try physical therapy? Also, has she had a colonoscopy? This would r/o anything GI-related and they could potentially perform an EGD at the same time.

## 2019-07-15 NOTE — TELEPHONE ENCOUNTER
Spoke to pt, informed her the US Abdomen results were negative. Pt states the abdominal pain has improved in intensity (2-3 on scale from 0-10) and recurrences but has not subsided completely.  Pt informed staff will call back with any further recommendatio

## 2019-07-15 NOTE — TELEPHONE ENCOUNTER
Spoke to pt, she is interested in a referral to GI, preferably in the [facility] area. Pended referral to Dr. Celia Hearn visits, dx: abdominal pain, please change as needed.

## 2019-07-15 NOTE — TELEPHONE ENCOUNTER
We can do a Cologuard but this would not rule out inflammatory bowel disease or other GI-related process contributing to her symptoms. This is a colon cancer screening only. Pls confirm this is the route she wants to take.     I'd suggest seeing a GI doctor

## 2019-07-15 NOTE — TELEPHONE ENCOUNTER
----- Message from Lorenza Knox DO sent at 7/14/2019 10:33 PM CDT -----  Abd doppler US is NEGATIVE. There is no evidence of arterial or venous obstruction in the abdomen. How is patient doing?

## 2019-07-23 PROBLEM — Z91.89 AT HIGH RISK FOR BREAST CANCER: Status: ACTIVE | Noted: 2019-07-23

## 2019-08-09 PROCEDURE — 88360 TUMOR IMMUNOHISTOCHEM/MANUAL: CPT | Performed by: RADIOLOGY

## 2019-08-09 PROCEDURE — 88305 TISSUE EXAM BY PATHOLOGIST: CPT | Performed by: RADIOLOGY

## 2019-09-04 LAB — AMB EXT COLOGUARD RESULT: NEGATIVE

## 2019-09-06 ENCOUNTER — TELEPHONE (OUTPATIENT)
Dept: FAMILY MEDICINE CLINIC | Facility: CLINIC | Age: 49
End: 2019-09-06

## 2019-09-06 NOTE — TELEPHONE ENCOUNTER
Rcvd fax from [employer] containing results for pt's Cologuard: Negative  Report placed on provider's desk for review and signature.  Results entered into External Result Console

## 2019-09-12 PROCEDURE — 88305 TISSUE EXAM BY PATHOLOGIST: CPT | Performed by: RADIOLOGY

## 2019-10-03 ENCOUNTER — TELEPHONE (OUTPATIENT)
Dept: MAMMOGRAPHY | Age: 49
End: 2019-10-03

## 2019-10-03 NOTE — TELEPHONE ENCOUNTER
Spoke with pt and provided pre-procedure instructions for SLN mapping/lymphedema education and needle LOC scheduled for Monday prior to surgery.  I explained that pt will travel through the lobby to the [facility] to have LOC preformed by the Radiologist. Rajendra Roman

## 2019-10-07 ENCOUNTER — ANESTHESIA EVENT (OUTPATIENT)
Dept: SURGERY | Facility: HOSPITAL | Age: 49
End: 2019-10-07

## 2019-10-07 ENCOUNTER — HOSPITAL ENCOUNTER (OUTPATIENT)
Dept: NUCLEAR MEDICINE | Facility: HOSPITAL | Age: 49
Discharge: HOME | End: 2019-10-07
Attending: SURGERY | Admitting: SURGERY
Payer: COMMERCIAL

## 2019-10-07 ENCOUNTER — APPOINTMENT (OUTPATIENT)
Dept: MAMMOGRAPHY | Facility: HOSPITAL | Age: 49
End: 2019-10-07
Attending: SURGERY
Payer: COMMERCIAL

## 2019-10-07 ENCOUNTER — ANESTHESIA (OUTPATIENT)
Dept: SURGERY | Facility: HOSPITAL | Age: 49
End: 2019-10-07

## 2019-10-07 ENCOUNTER — HOSPITAL ENCOUNTER (OUTPATIENT)
Facility: HOSPITAL | Age: 49
Setting detail: HOSPITAL OUTPATIENT SURGERY
Discharge: HOME | End: 2019-10-07
Attending: SURGERY | Admitting: SURGERY
Payer: COMMERCIAL

## 2019-10-07 ENCOUNTER — HOSPITAL ENCOUNTER (OUTPATIENT)
Dept: MAMMOGRAPHY | Facility: HOSPITAL | Age: 49
Discharge: HOME | End: 2019-10-07
Attending: SURGERY
Payer: COMMERCIAL

## 2019-10-07 VITALS
WEIGHT: 159.63 LBS | HEART RATE: 74 BPM | TEMPERATURE: 98 F | BODY MASS INDEX: 28.29 KG/M2 | SYSTOLIC BLOOD PRESSURE: 120 MMHG | OXYGEN SATURATION: 100 % | HEIGHT: 63 IN | DIASTOLIC BLOOD PRESSURE: 69 MMHG | RESPIRATION RATE: 18 BRPM

## 2019-10-07 DIAGNOSIS — C50.911 MALIGNANT NEOPLASM OF RIGHT FEMALE BREAST, UNSPECIFIED ESTROGEN RECEPTOR STATUS, UNSPECIFIED SITE OF BREAST (HCC): ICD-10-CM

## 2019-10-07 LAB
POCT LOT NUMBER: NORMAL
POCT URINE PREGNANCY: NEGATIVE

## 2019-10-07 PROCEDURE — 81025 URINE PREGNANCY TEST: CPT | Performed by: SURGERY

## 2019-10-07 PROCEDURE — 07B50ZX EXCISION OF RIGHT AXILLARY LYMPHATIC, OPEN APPROACH, DIAGNOSTIC: ICD-10-PCS | Performed by: SURGERY

## 2019-10-07 PROCEDURE — 0HBT0ZZ EXCISION OF RIGHT BREAST, OPEN APPROACH: ICD-10-PCS | Performed by: SURGERY

## 2019-10-07 PROCEDURE — 88342 IMHCHEM/IMCYTCHM 1ST ANTB: CPT | Performed by: SURGERY

## 2019-10-07 PROCEDURE — 19285 PERQ DEV BREAST 1ST US IMAG: CPT | Performed by: SURGERY

## 2019-10-07 PROCEDURE — 88307 TISSUE EXAM BY PATHOLOGIST: CPT | Performed by: SURGERY

## 2019-10-07 PROCEDURE — 76098 X-RAY EXAM SURGICAL SPECIMEN: CPT | Performed by: SURGERY

## 2019-10-07 PROCEDURE — 88341 IMHCHEM/IMCYTCHM EA ADD ANTB: CPT | Performed by: SURGERY

## 2019-10-07 PROCEDURE — 78195 LYMPH SYSTEM IMAGING: CPT | Performed by: SURGERY

## 2019-10-07 PROCEDURE — 77065 DX MAMMO INCL CAD UNI: CPT | Performed by: SURGERY

## 2019-10-07 RX ORDER — ACETAMINOPHEN 500 MG
500 TABLET ORAL EVERY 6 HOURS PRN
COMMUNITY
End: 2019-10-16

## 2019-10-07 RX ORDER — LIDOCAINE AND PRILOCAINE 25; 25 MG/G; MG/G
CREAM TOPICAL ONCE
Status: COMPLETED | OUTPATIENT
Start: 2019-10-07 | End: 2019-10-07

## 2019-10-07 RX ORDER — HYDROCODONE BITARTRATE AND ACETAMINOPHEN 5; 325 MG/1; MG/1
2 TABLET ORAL AS NEEDED
Status: DISCONTINUED | OUTPATIENT
Start: 2019-10-07 | End: 2019-10-07

## 2019-10-07 RX ORDER — ACETAMINOPHEN 500 MG
1000 TABLET ORAL ONCE
Status: DISCONTINUED | OUTPATIENT
Start: 2019-10-07 | End: 2019-10-07 | Stop reason: HOSPADM

## 2019-10-07 RX ORDER — BUPIVACAINE HYDROCHLORIDE 5 MG/ML
INJECTION, SOLUTION EPIDURAL; INTRACAUDAL AS NEEDED
Status: DISCONTINUED | OUTPATIENT
Start: 2019-10-07 | End: 2019-10-07 | Stop reason: HOSPADM

## 2019-10-07 RX ORDER — CEFAZOLIN SODIUM/WATER 2 G/20 ML
2 SYRINGE (ML) INTRAVENOUS ONCE
Status: COMPLETED | OUTPATIENT
Start: 2019-10-07 | End: 2019-10-07

## 2019-10-07 RX ORDER — SODIUM CHLORIDE, SODIUM LACTATE, POTASSIUM CHLORIDE, CALCIUM CHLORIDE 600; 310; 30; 20 MG/100ML; MG/100ML; MG/100ML; MG/100ML
INJECTION, SOLUTION INTRAVENOUS CONTINUOUS
Status: DISCONTINUED | OUTPATIENT
Start: 2019-10-07 | End: 2019-10-07

## 2019-10-07 RX ORDER — LIDOCAINE HYDROCHLORIDE AND EPINEPHRINE 10; 10 MG/ML; UG/ML
INJECTION, SOLUTION INFILTRATION; PERINEURAL AS NEEDED
Status: DISCONTINUED | OUTPATIENT
Start: 2019-10-07 | End: 2019-10-07 | Stop reason: HOSPADM

## 2019-10-07 RX ORDER — HYDROCODONE BITARTRATE AND ACETAMINOPHEN 5; 325 MG/1; MG/1
1-2 TABLET ORAL EVERY 4 HOURS PRN
Qty: 20 TABLET | Refills: 0 | Status: SHIPPED | OUTPATIENT
Start: 2019-10-07 | End: 2019-10-16

## 2019-10-07 RX ORDER — METOCLOPRAMIDE HYDROCHLORIDE 5 MG/ML
10 INJECTION INTRAMUSCULAR; INTRAVENOUS AS NEEDED
Status: DISCONTINUED | OUTPATIENT
Start: 2019-10-07 | End: 2019-10-07

## 2019-10-07 RX ORDER — DIAZEPAM 5 MG/1
5 TABLET ORAL AS NEEDED
Status: DISCONTINUED | OUTPATIENT
Start: 2019-10-07 | End: 2019-10-07 | Stop reason: HOSPADM

## 2019-10-07 RX ORDER — HYDROMORPHONE HYDROCHLORIDE 1 MG/ML
0.4 INJECTION, SOLUTION INTRAMUSCULAR; INTRAVENOUS; SUBCUTANEOUS EVERY 5 MIN PRN
Status: DISCONTINUED | OUTPATIENT
Start: 2019-10-07 | End: 2019-10-07

## 2019-10-07 RX ORDER — HYDROCODONE BITARTRATE AND ACETAMINOPHEN 5; 325 MG/1; MG/1
1 TABLET ORAL AS NEEDED
Status: DISCONTINUED | OUTPATIENT
Start: 2019-10-07 | End: 2019-10-07

## 2019-10-07 RX ORDER — NALOXONE HYDROCHLORIDE 0.4 MG/ML
80 INJECTION, SOLUTION INTRAMUSCULAR; INTRAVENOUS; SUBCUTANEOUS AS NEEDED
Status: DISCONTINUED | OUTPATIENT
Start: 2019-10-07 | End: 2019-10-07

## 2019-10-07 RX ORDER — BACITRACIN 50000 [USP'U]/1
INJECTION, POWDER, LYOPHILIZED, FOR SOLUTION INTRAMUSCULAR AS NEEDED
Status: DISCONTINUED | OUTPATIENT
Start: 2019-10-07 | End: 2019-10-07 | Stop reason: HOSPADM

## 2019-10-07 RX ORDER — DIPHENHYDRAMINE HYDROCHLORIDE 50 MG/ML
12.5 INJECTION INTRAMUSCULAR; INTRAVENOUS AS NEEDED
Status: DISCONTINUED | OUTPATIENT
Start: 2019-10-07 | End: 2019-10-07

## 2019-10-07 RX ORDER — LABETALOL HYDROCHLORIDE 5 MG/ML
5 INJECTION, SOLUTION INTRAVENOUS EVERY 5 MIN PRN
Status: DISCONTINUED | OUTPATIENT
Start: 2019-10-07 | End: 2019-10-07

## 2019-10-07 RX ORDER — CEFAZOLIN SODIUM/WATER 2 G/20 ML
SYRINGE (ML) INTRAVENOUS
Status: DISCONTINUED
Start: 2019-10-07 | End: 2019-10-07

## 2019-10-07 RX ORDER — ONDANSETRON 2 MG/ML
4 INJECTION INTRAMUSCULAR; INTRAVENOUS AS NEEDED
Status: DISCONTINUED | OUTPATIENT
Start: 2019-10-07 | End: 2019-10-07

## 2019-10-07 NOTE — ANESTHESIA PREPROCEDURE EVALUATION
PRE-OP EVALUATION    Patient Name: Priyanka Herron    Pre-op Diagnosis: Malignant neoplasm of right female breast, unspecified estrogen receptor status, unspecified site of [facility] [C50.911]    Procedure(s):  RIGHT BREAST LUMPECTOMY AFTER NEEDLE LOCA Past Surgical History:   Procedure Laterality Date   •       x3   • HYSTEROSCOPY WITH ENDOMETRIAL ABLATION N/A 12/15/2015    Performed by Kendra Mendes MD at [address] [place] [address] / [facility]

## 2019-10-07 NOTE — OPERATIVE REPORT
[facility]    PATIENT'S NAME: Herve Velázquez   ATTENDING PHYSICIAN: Destinee Ghosh M.D. OPERATING PHYSICIAN: Destinee Ghosh M.D.    PATIENT ACCOUNT#:   [de-identified]    LOCATION:  PACU [facility] PACU 8 EDWP 10  MEDICAL RECORD #:   [id number]       DATE OF fascia. The sentinel node was identified and sent for permanent analysis. There were no additional sentinel nodes. At this time, the procedure was complete. Hemostasis was good. The wounds were irrigated with sterile saline.   The wounds were closed in Additional Progress Note...

## 2019-10-07 NOTE — H&P
History & Physical Examination    Patient Name: Derek Cervantes  MRN: [id number]  CSN: [id number]  YOB: 1970    Diagnosis: right breast cancer          Medications Prior to Admission:  acetaminophen 500 MG Oral Tab Take 500 mg by mouth every Frequency: 2-4 times a month      Drinks per session: 1 or 2      Binge frequency: Never      SYSTEM Check if Review is Normal Check if Physical Exam is Normal If not normal, please explain:   HEENT [x ] x    NECK & BACK x x    HEART x x    LUNGS x x    AB

## 2019-10-07 NOTE — IMAGING NOTE
Assisted Dr. Nusrat Colon with Wire Localization of Breast for Lumpectomy. Procedure explained and emotional support provided throughout. Amaury Edmonds  tolerated procedure well with minimal discomfort.  Patrickaze placed around wire with styrofoam cup to cover

## 2019-10-21 PROBLEM — C50.919 BREAST CANCER (HCC): Status: ACTIVE | Noted: 2019-10-21

## 2019-12-18 ENCOUNTER — OFFICE VISIT (OUTPATIENT)
Dept: FAMILY MEDICINE CLINIC | Facility: CLINIC | Age: 49
End: 2019-12-18
Payer: COMMERCIAL

## 2019-12-18 VITALS
TEMPERATURE: 98 F | BODY MASS INDEX: 28.88 KG/M2 | SYSTOLIC BLOOD PRESSURE: 120 MMHG | DIASTOLIC BLOOD PRESSURE: 78 MMHG | WEIGHT: 163 LBS | OXYGEN SATURATION: 98 % | RESPIRATION RATE: 17 BRPM | HEART RATE: 70 BPM | HEIGHT: 63 IN

## 2019-12-18 DIAGNOSIS — M71.9 BURSITIS OF OTHER SITE: ICD-10-CM

## 2019-12-18 DIAGNOSIS — M54.50 ACUTE LEFT-SIDED LOW BACK PAIN WITHOUT SCIATICA: Primary | ICD-10-CM

## 2019-12-18 DIAGNOSIS — R21 RASH AND NONSPECIFIC SKIN ERUPTION: ICD-10-CM

## 2019-12-18 DIAGNOSIS — M76.02 GLUTEAL TENDINITIS OF LEFT BUTTOCK: ICD-10-CM

## 2019-12-18 DIAGNOSIS — N89.8 VAGINA ITCHING: ICD-10-CM

## 2019-12-18 DIAGNOSIS — R35.0 URINARY FREQUENCY: ICD-10-CM

## 2019-12-18 PROCEDURE — 99214 OFFICE O/P EST MOD 30 MIN: CPT | Performed by: FAMILY MEDICINE

## 2019-12-18 PROCEDURE — 81003 URINALYSIS AUTO W/O SCOPE: CPT | Performed by: FAMILY MEDICINE

## 2019-12-18 PROCEDURE — 87086 URINE CULTURE/COLONY COUNT: CPT | Performed by: FAMILY MEDICINE

## 2019-12-18 RX ORDER — FLUCONAZOLE 150 MG/1
150 TABLET ORAL
Qty: 4 TABLET | Refills: 0 | Status: SHIPPED | OUTPATIENT
Start: 2019-12-18 | End: 2020-01-15 | Stop reason: ALTCHOICE

## 2019-12-18 NOTE — PROGRESS NOTES
HPI:   Patient presents with:  Back Pain: lumbar pain for about 1 month, lower L side  Urinary Frequency: for about 1 mo  Yeast Infection: vaginal itching started today, requesting rx Lina Troy is a 47year old female who is here for c glasses/contacts     Past Surgical History:   Procedure Laterality Date   • BREAST SENTINEL LYMPH NODE BIOPSY Right 10/7/2019    Performed by Juan Cortez MD at [address]   •       x3   • HYSTEROSCOPY WITH ENDOMETRIAL ABLATION N/A 12/15/201 cyanosis, clubbing or edema B  BACK: SPINE: nontender to palpation, Paraspinal muscle spasm: no and tenderness:no, tender left mid gluteal, no swelling, no masses, straight leg raise: neg  NEURO: LE strength 4-5/5 bilaterally, gross sensation intact, morrow [E]      Meds & Refills for this Visit:  Requested Prescriptions     Signed Prescriptions Disp Refills   • fluconazole (DIFLUCAN) 150 MG Oral Tab 4 tablet 0     Sig: Take 1 tablet (150 mg total) by mouth Every other day PRN.        Imaging & Consults:  None

## 2019-12-18 NOTE — PATIENT INSTRUCTIONS
Lumbar Rotation    1. Lie on your back on the floor, with your knees bent and your feet flat on the floor. Don’t press your neck or lower back to the floor. 2. Lean both of your knees to one side. Turn your head in the opposite direction.  Keep your shou Date Last Reviewed: 3/1/2018  © 0543-7955 [employer] [address]. [address] [facility] [place] [address] [place]. All rights reserved. This information is not intended as a substitute for professional medical care.  Always follow your healthcare professional' © 9754-0128 [employer] [address]. [address] [facility] [place] [address] [place]. All rights reserved. This information is not intended as a substitute for professional medical care. Always follow your healthcare professional's instructions.         Back Ex

## 2020-01-24 ENCOUNTER — OFFICE VISIT (OUTPATIENT)
Dept: FAMILY MEDICINE CLINIC | Facility: CLINIC | Age: 50
End: 2020-01-24

## 2020-01-24 VITALS — WEIGHT: 163 LBS | BODY MASS INDEX: 28.88 KG/M2 | HEIGHT: 63 IN

## 2020-01-24 DIAGNOSIS — L57.0 AK (ACTINIC KERATOSIS): ICD-10-CM

## 2020-01-24 DIAGNOSIS — R23.8 INTRINSIC AGING OF FACIAL SKIN: Primary | ICD-10-CM

## 2020-01-24 NOTE — PROCEDURES
HPI:   Patient presents with:  Derm Problem: Microdermabrasion #3 and botox #6       Segun Jung is a 47year old female who presents for cosmetic facial microdermabrasion and BOTOX injections for normal skin aging effects.     She is without other com grossly wnl B UE and LE   PSYCH: mood and affect WNL,  speech clear and congruent with thoughts    ASSESSMENT AND PLAN:   1.  Patient is a 47year old female who presents for cosmetic facial microdermabrasion and BOTOX injections for normal skin aging effec

## 2020-03-10 ENCOUNTER — TELEPHONE (OUTPATIENT)
Dept: FAMILY MEDICINE CLINIC | Facility: CLINIC | Age: 50
End: 2020-03-10

## 2020-03-10 NOTE — TELEPHONE ENCOUNTER
Pt left a VM on office phone stating she hit her head after fainting. Upon returning call, pt states she stood up quickly in the middle of the night to assist her spouse, she states she then fainted and hit her her face just above the bridge of her nose.  R

## 2020-03-12 ENCOUNTER — TELEPHONE (OUTPATIENT)
Dept: FAMILY MEDICINE CLINIC | Facility: CLINIC | Age: 50
End: 2020-03-12

## 2020-03-12 NOTE — TELEPHONE ENCOUNTER
Spoke with patient. She reports she has had nasal congestion, green mucous, a headache, and some \"wooziness\" x 5 days. She also reports she thinks she has a concussion after fainting in her home on 3/10 and hit head, see previous note in chart.  She did n

## 2020-03-12 NOTE — TELEPHONE ENCOUNTER
Sinus pain and greenish discharge for past 5 days and is prone to sinus infections and does not screen positively to Timothy questions and would just like ABT called in

## 2020-03-23 ENCOUNTER — TELEPHONE (OUTPATIENT)
Dept: FAMILY MEDICINE CLINIC | Facility: CLINIC | Age: 50
End: 2020-03-23

## 2020-03-23 DIAGNOSIS — H92.03 OTALGIA, BILATERAL: ICD-10-CM

## 2020-03-23 DIAGNOSIS — R09.81 NASAL CONGESTION: ICD-10-CM

## 2020-03-23 DIAGNOSIS — R44.8 FACIAL PRESSURE: ICD-10-CM

## 2020-03-23 DIAGNOSIS — R12 CHRONIC HEARTBURN: ICD-10-CM

## 2020-03-23 DIAGNOSIS — J01.90 ACUTE NON-RECURRENT SINUSITIS, UNSPECIFIED LOCATION: Primary | ICD-10-CM

## 2020-03-23 PROCEDURE — 99213 OFFICE O/P EST LOW 20 MIN: CPT | Performed by: NURSE PRACTITIONER

## 2020-03-23 RX ORDER — AMOXICILLIN AND CLAVULANATE POTASSIUM 875; 125 MG/1; MG/1
1 TABLET, FILM COATED ORAL 2 TIMES DAILY
Qty: 20 TABLET | Refills: 0 | Status: SHIPPED | OUTPATIENT
Start: 2020-03-23 | End: 2020-04-02

## 2020-03-23 NOTE — TELEPHONE ENCOUNTER
Virtual/Telephone Check-In    Óscar Villagran verbally consents to a Virtual/Telephone Check-In service on 03/23/20. Patient understands and accepts financial responsibility for any deductible, co-insurance and/or co-pays associated with this service. Application topically 2 (two) times daily.  As needed to AA until desire affect acheived 1 Tube 1     Past Medical History:   Diagnosis Date   • Tinnitus    • Visual impairment     glasses/contacts     Past Surgical History:   Procedure Laterality Date   • to: respiratory distress, shortness of breath and  wheezing, worsening fever, cough and mental status. The patient (or patient's parent if <17 y/o) indicates understanding of the above recommendations and agrees to the above plan.     No orders of the

## 2020-03-24 ENCOUNTER — TELEPHONE (OUTPATIENT)
Dept: FAMILY MEDICINE CLINIC | Facility: CLINIC | Age: 50
End: 2020-03-24

## 2020-03-24 NOTE — TELEPHONE ENCOUNTER
[facility] is requesting an alternative per insurance. Medication for esomeprazole 20 mg. Please send RX for omeprazole, pantoprazole, Raeprazole or Dexilant.

## 2020-03-30 NOTE — TELEPHONE ENCOUNTER
Spoke to patient, she will get the OTC Nexium or try Pepcid. Also advised against daily use of PPI's if not necessary; discussed risks including stomach cancer, osteoporosis.  Also discussed risks of untreated GERD including esophageal mucosa changes and ca

## 2020-03-31 ENCOUNTER — TELEPHONE (OUTPATIENT)
Dept: FAMILY MEDICINE CLINIC | Facility: CLINIC | Age: 50
End: 2020-03-31

## 2020-03-31 NOTE — TELEPHONE ENCOUNTER
I called [facility] pharmacy . Patient's esomeprazole 20 mg cap  that was written on 3- is not covered by insurance. Omeprazole, pantoprazole or dexilant is covered. I pended orders for omeprazole for you to sign.   If you would like something else can

## 2020-03-31 NOTE — TELEPHONE ENCOUNTER
I spoke to patient yesterday. She wants to continue on esomeprazole OTC, she does not want another prescription at this time. She is planning to f/u with GI once COVID isolation ends. Thank you!

## 2020-05-04 PROCEDURE — 88305 TISSUE EXAM BY PATHOLOGIST: CPT | Performed by: RADIOLOGY

## 2020-06-20 ENCOUNTER — WALK IN (OUTPATIENT)
Dept: URGENT CARE | Age: 50
End: 2020-06-20
Attending: EMERGENCY MEDICINE

## 2020-06-20 DIAGNOSIS — N61.0 MASTITIS, ACUTE: Primary | ICD-10-CM

## 2020-06-20 PROCEDURE — 99202 OFFICE O/P NEW SF 15 MIN: CPT

## 2020-06-20 RX ORDER — TAMOXIFEN CITRATE 10 MG/1
10 TABLET ORAL 2 TIMES DAILY
COMMUNITY

## 2020-06-20 RX ORDER — CEPHALEXIN 500 MG/1
500 CAPSULE ORAL 4 TIMES DAILY
Qty: 40 CAPSULE | Refills: 0 | Status: SHIPPED | OUTPATIENT
Start: 2020-06-20 | End: 2020-06-30

## 2020-06-20 RX ORDER — FLUTICASONE PROPIONATE 50 MCG
SPRAY, SUSPENSION (ML) NASAL
COMMUNITY

## 2020-06-20 ASSESSMENT — PAIN SCALES - GENERAL: PAINLEVEL: 5-6

## 2020-06-22 ENCOUNTER — TELEPHONE (OUTPATIENT)
Dept: INTEGRATIVE MEDICINE | Facility: CLINIC | Age: 50
End: 2020-06-22

## 2020-06-22 NOTE — TELEPHONE ENCOUNTER
Patient went into the IC over the weekend and was diagnosed with mastoiditis, she has been taking antibiotics, chills and body aches which subsided 48 hours although the area where infection is located has increased.  Patient had lumpectomy in October, coul

## 2020-06-22 NOTE — TELEPHONE ENCOUNTER
Spoke to patient, she states she has an appointment with her surgeon on 6/23 at 7:40am. Pt will f/u with update after that visit and schedule with Dr. Terrence Hsu as needed.

## 2020-06-25 ENCOUNTER — APPOINTMENT (OUTPATIENT)
Dept: ULTRASOUND IMAGING | Facility: HOSPITAL | Age: 50
DRG: 601 | End: 2020-06-25
Attending: HOSPITALIST
Payer: COMMERCIAL

## 2020-06-25 ENCOUNTER — HOSPITAL ENCOUNTER (INPATIENT)
Facility: HOSPITAL | Age: 50
LOS: 2 days | Discharge: HOME | DRG: 601 | End: 2020-06-27
Attending: EMERGENCY MEDICINE | Admitting: HOSPITALIST
Payer: COMMERCIAL

## 2020-06-25 DIAGNOSIS — N61.0 CELLULITIS OF RIGHT BREAST: Primary | ICD-10-CM

## 2020-06-25 PROBLEM — L03.90 CELLULITIS: Status: ACTIVE | Noted: 2020-06-25

## 2020-06-25 LAB
ALBUMIN SERPL-MCNC: 3.7 G/DL (ref 3.4–5)
ALBUMIN/GLOB SERPL: 0.9 {RATIO} (ref 1–2)
ALP LIVER SERPL-CCNC: 50 U/L (ref 41–108)
ALT SERPL-CCNC: 24 U/L (ref 13–56)
ANION GAP SERPL CALC-SCNC: 6 MMOL/L (ref 0–18)
AST SERPL-CCNC: 15 U/L (ref 15–37)
BASOPHILS # BLD AUTO: 0.03 X10(3) UL (ref 0–0.2)
BASOPHILS NFR BLD AUTO: 0.6 %
BILIRUB SERPL-MCNC: 0.4 MG/DL (ref 0.1–2)
BUN BLD-MCNC: 9 MG/DL (ref 7–18)
BUN/CREAT SERPL: 11.5 (ref 10–20)
CALCIUM BLD-MCNC: 9.1 MG/DL (ref 8.5–10.1)
CHLORIDE SERPL-SCNC: 105 MMOL/L (ref 98–112)
CO2 SERPL-SCNC: 26 MMOL/L (ref 21–32)
CREAT BLD-MCNC: 0.78 MG/DL (ref 0.55–1.02)
DEPRECATED RDW RBC AUTO: 37.9 FL (ref 35.1–46.3)
EOSINOPHIL # BLD AUTO: 0.22 X10(3) UL (ref 0–0.7)
EOSINOPHIL NFR BLD AUTO: 4.5 %
ERYTHROCYTE [DISTWIDTH] IN BLOOD BY AUTOMATED COUNT: 11.9 % (ref 11–15)
GLOBULIN PLAS-MCNC: 4.3 G/DL (ref 2.8–4.4)
GLUCOSE BLD-MCNC: 94 MG/DL (ref 70–99)
HCT VFR BLD AUTO: 40.3 % (ref 35–48)
HGB BLD-MCNC: 13.3 G/DL (ref 12–16)
IMM GRANULOCYTES # BLD AUTO: 0.02 X10(3) UL (ref 0–1)
IMM GRANULOCYTES NFR BLD: 0.4 %
LACTATE SERPL-SCNC: 2 MMOL/L (ref 0.4–2)
LYMPHOCYTES # BLD AUTO: 1.59 X10(3) UL (ref 1–4)
LYMPHOCYTES NFR BLD AUTO: 32.7 %
M PROTEIN MFR SERPL ELPH: 8 G/DL (ref 6.4–8.2)
MCH RBC QN AUTO: 29.1 PG (ref 26–34)
MCHC RBC AUTO-ENTMCNC: 33 G/DL (ref 31–37)
MCV RBC AUTO: 88.2 FL (ref 80–100)
MONOCYTES # BLD AUTO: 0.45 X10(3) UL (ref 0.1–1)
MONOCYTES NFR BLD AUTO: 9.3 %
NEUTROPHILS # BLD AUTO: 2.55 X10 (3) UL (ref 1.5–7.7)
NEUTROPHILS # BLD AUTO: 2.55 X10(3) UL (ref 1.5–7.7)
NEUTROPHILS NFR BLD AUTO: 52.5 %
OSMOLALITY SERPL CALC.SUM OF ELEC: 282 MOSM/KG (ref 275–295)
PLATELET # BLD AUTO: 272 10(3)UL (ref 150–450)
POTASSIUM SERPL-SCNC: 3.4 MMOL/L (ref 3.5–5.1)
RBC # BLD AUTO: 4.57 X10(6)UL (ref 3.8–5.3)
SARS-COV-2 RNA RESP QL NAA+PROBE: NOT DETECTED
SODIUM SERPL-SCNC: 137 MMOL/L (ref 136–145)
WBC # BLD AUTO: 4.9 X10(3) UL (ref 4–11)

## 2020-06-25 PROCEDURE — 99223 1ST HOSP IP/OBS HIGH 75: CPT | Performed by: HOSPITALIST

## 2020-06-25 PROCEDURE — 76642 ULTRASOUND BREAST LIMITED: CPT | Performed by: HOSPITALIST

## 2020-06-25 RX ORDER — CEFAZOLIN SODIUM/WATER 2 G/20 ML
2 SYRINGE (ML) INTRAVENOUS EVERY 8 HOURS
Status: DISCONTINUED | OUTPATIENT
Start: 2020-06-26 | End: 2020-06-26

## 2020-06-25 RX ORDER — POTASSIUM CHLORIDE 20 MEQ/1
40 TABLET, EXTENDED RELEASE ORAL EVERY 4 HOURS
Status: COMPLETED | OUTPATIENT
Start: 2020-06-25 | End: 2020-06-26

## 2020-06-25 RX ORDER — METOCLOPRAMIDE HYDROCHLORIDE 5 MG/ML
10 INJECTION INTRAMUSCULAR; INTRAVENOUS EVERY 8 HOURS PRN
Status: DISCONTINUED | OUTPATIENT
Start: 2020-06-25 | End: 2020-06-27

## 2020-06-25 RX ORDER — ACETAMINOPHEN 325 MG/1
650 TABLET ORAL EVERY 6 HOURS PRN
Status: DISCONTINUED | OUTPATIENT
Start: 2020-06-25 | End: 2020-06-27

## 2020-06-25 RX ORDER — FLUTICASONE PROPIONATE 50 MCG
2 SPRAY, SUSPENSION (ML) NASAL DAILY PRN
COMMUNITY
End: 2021-03-05

## 2020-06-25 RX ORDER — ACETAMINOPHEN 325 MG/1
650 TABLET ORAL EVERY 4 HOURS PRN
Status: DISCONTINUED | OUTPATIENT
Start: 2020-06-25 | End: 2020-06-27

## 2020-06-25 RX ORDER — BISACODYL 10 MG
10 SUPPOSITORY, RECTAL RECTAL
Status: DISCONTINUED | OUTPATIENT
Start: 2020-06-25 | End: 2020-06-27

## 2020-06-25 RX ORDER — ONDANSETRON 2 MG/ML
4 INJECTION INTRAMUSCULAR; INTRAVENOUS EVERY 6 HOURS PRN
Status: DISCONTINUED | OUTPATIENT
Start: 2020-06-25 | End: 2020-06-27

## 2020-06-25 RX ORDER — HYDROCODONE BITARTRATE AND ACETAMINOPHEN 5; 325 MG/1; MG/1
1 TABLET ORAL EVERY 4 HOURS PRN
Status: DISCONTINUED | OUTPATIENT
Start: 2020-06-25 | End: 2020-06-27

## 2020-06-25 RX ORDER — HYDROCODONE BITARTRATE AND ACETAMINOPHEN 5; 325 MG/1; MG/1
2 TABLET ORAL EVERY 4 HOURS PRN
Status: DISCONTINUED | OUTPATIENT
Start: 2020-06-25 | End: 2020-06-27

## 2020-06-25 RX ORDER — SODIUM PHOSPHATE, DIBASIC AND SODIUM PHOSPHATE, MONOBASIC 7; 19 G/133ML; G/133ML
1 ENEMA RECTAL ONCE AS NEEDED
Status: DISCONTINUED | OUTPATIENT
Start: 2020-06-25 | End: 2020-06-27

## 2020-06-25 RX ORDER — SODIUM CHLORIDE 9 MG/ML
INJECTION, SOLUTION INTRAVENOUS CONTINUOUS
Status: ACTIVE | OUTPATIENT
Start: 2020-06-25 | End: 2020-06-25

## 2020-06-25 RX ORDER — TAMOXIFEN CITRATE 10 MG/1
20 TABLET ORAL DAILY
Status: DISCONTINUED | OUTPATIENT
Start: 2020-06-26 | End: 2020-06-27

## 2020-06-25 RX ORDER — POLYETHYLENE GLYCOL 3350 17 G/17G
17 POWDER, FOR SOLUTION ORAL DAILY PRN
Status: DISCONTINUED | OUTPATIENT
Start: 2020-06-25 | End: 2020-06-27

## 2020-06-25 RX ORDER — DOCUSATE SODIUM 100 MG/1
100 CAPSULE, LIQUID FILLED ORAL 2 TIMES DAILY
Status: DISCONTINUED | OUTPATIENT
Start: 2020-06-25 | End: 2020-06-27

## 2020-06-25 RX ORDER — PANTOPRAZOLE SODIUM 20 MG/1
20 TABLET, DELAYED RELEASE ORAL
Status: DISCONTINUED | OUTPATIENT
Start: 2020-06-26 | End: 2020-06-27

## 2020-06-25 NOTE — ED NOTES
Report given and endorsed to jl naidu, pt aox3,nad,skin warm and intact, pt ambulates steady and unassisted, bed status\"dirty\", when bed ready pt rtg

## 2020-06-25 NOTE — H&P
[facility]  History and Physical     Virgil Ellington Patient Status:  Emergency    1970 MRN [id number]   Location [facility] Attending Adithya Melchor MD   Hosp Day # 0 PCP Viktoriya Deleon MD     Chief LIGATION         Social History:  reports that she has never smoked. She has never used smokeless tobacco. She reports current alcohol use. She reports that she does not use drugs.     Family History:   Family History   Problem Relation Age of Onset   • Can No edema or cyanosis. Integument: No rashes or lesions. Psychiatric: Appropriate mood and affect.       Diagnostic Data:      Labs:  Recent Labs   Lab 06/25/20  1529   WBC 4.9   HGB 13.3   MCV 88.2   .0       Recent Labs   Lab 06/25/20  1530   GLU

## 2020-06-25 NOTE — ED PROVIDER NOTES
Patient Seen in: [facility] Emergency Department      History   Patient presents with:  Cellulitis    Stated Complaint: dx w/ mastitis ~ on abx x5 days and not improved.  MD [address] instructed to ER     HPI  44 yo female with history of right breast negative. Positive for stated complaint: dx w/ mastitis ~ on abx x5 days and not improved. MD [address] instructed to ER   Other systems are as noted in HPI. Constitutional and vital signs reviewed.       All other systems reviewed and negative except a were created for panel order CBC WITH DIFFERENTIAL WITH PLATELET.   Procedure                               Abnormality         Status                     ---------                               -----------         ------                     CBC W/ DIFFERRANDOLPH

## 2020-06-25 NOTE — ED INITIAL ASSESSMENT (HPI)
Pt went to immediate care last sat for chills and rash, pt dx mastitis, given antibiotics, has not improved , given bactrim and has not improved, aox3,nad,skin warm and intact, pt ambulates steady and unassisted

## 2020-06-25 NOTE — CONSULTS
[facility]  Report of Consultation    Gil Martinez Patient Status:  Emergency    1970 MRN [id number]   Location [address] Attending Jaad Ortega MD   [facility] Day # 0 PCP Marito Ballard MD     20 History   Problem Relation Age of Onset   • Cancer Father    • Cancer Mother    • Breast Cancer Mother 46        age of dx 45       Social History:     reports that she has never smoked.  She has never used smokeless tobacco. She reports current alcohol use results for input(s): ALT, AST, ALB, AMYLASE, LIPASE, LDH in the last 168 hours. Invalid input(s): ALPHOS, TBIL, DBIL, TPROT    No results for input(s): TROP in the last 168 hours. Radiology:    No results found.      Problem List:    Patient Ac

## 2020-06-26 LAB — POTASSIUM SERPL-SCNC: 4.4 MMOL/L (ref 3.5–5.1)

## 2020-06-26 PROCEDURE — 99232 SBSQ HOSP IP/OBS MODERATE 35: CPT | Performed by: HOSPITALIST

## 2020-06-26 RX ORDER — CEPHALEXIN 500 MG/1
500 CAPSULE ORAL 3 TIMES DAILY
Qty: 60 CAPSULE | Refills: 0 | Status: SHIPPED | OUTPATIENT
Start: 2020-06-26 | End: 2020-07-16

## 2020-06-26 RX ORDER — KETOROLAC TROMETHAMINE 30 MG/ML
30 INJECTION, SOLUTION INTRAMUSCULAR; INTRAVENOUS EVERY 6 HOURS PRN
Status: DISCONTINUED | OUTPATIENT
Start: 2020-06-26 | End: 2020-06-27

## 2020-06-26 NOTE — PLAN OF CARE
Pt Aox4. RA. VSS. Pt denies pain. Potassium replaced per electrolyte protocol. Antibiotics administered per MAR. ID consulted and will see pt in AM.   Ultrasound of breast negative. Will continue to monitor.       Problem: RISK FOR INFECTION - JOSEPH

## 2020-06-26 NOTE — PLAN OF CARE
Pt. A&O x4. VSS. Afebrile. Pt c/o headache; PRN tylenol given with effective relief. Pt. Receiving IV antibiotics. Plan to d/c to home tomorrow. Call light within reach. Will continue to monitor.      Problem: RISK FOR INFECTION - ADULT  Goal: Absence of fe

## 2020-06-26 NOTE — PROGRESS NOTES
Notes reviewed. Ultrasound negative for abscess. Clinically improved. Continue IV antibiotics.   Appreciate ID involvement

## 2020-06-26 NOTE — PROGRESS NOTES
[facility]  Progress Note     Pratibha Nitinantonia Patient Status:  Inpatient    1970 MRN [id number]   [facility] 4NW-A Attending Audi Rod MD   Hosp Day # 1 PCP Nakul Daniels MD     Chief Complaint: cellulitis    S • docusate sodium  100 mg Oral BID   • ceFAZolin  2 g Intravenous Q8H       ASSESSMENT / PLAN:     1. Right breast cellulitis/ mastitis  1. US negative for abscess  2. ID consult  3. Empiric Ancef  2.  H/o Breast cancer s/p lumpectomy, XRT, on Tamoxifen

## 2020-06-26 NOTE — CONSULTS
INFECTIOUS DISEASE [address] Patient Status:  Inpatient    1970 MRN [id number]   [facility] 4NW-A Attending Brittany Lainez MD   [facility] Day # 1 PCP Sorin Ramachandran tromethamine (TORADOL) 30 MG/ML injection 30 mg, 30 mg, Intravenous, Q6H PRN  •  cefTRIAXone Sodium (ROCEPHIN) 2 g in sodium chloride 0.9% 100 mL MBP/ADD-vantage, 2 g, Intravenous, Q24H  •  Pantoprazole Sodium (PROTONIX) EC tab 20 mg, 20 mg, Oral, QAM AC ()/(42-77) 110/66  HEENT: Moist mucous membranes. Extraocular muscles are intact. Right breast erythema fading,non tender  Neck: No lymphadenopathy. supple, no masses  Abdomen: Soft, nontender, nondistended. Positive bowel sounds.   Musculoskeletal:

## 2020-06-26 NOTE — PROGRESS NOTES
NURSING ADMISSION NOTE      Patient admitted via Cart  Oriented to room. Safety precautions initiated. Bed in low position. Call light in reach. Pt Aox4. RA. VSS. Pt denies pain. Right arm precautions from lobectomy.    Potassium replaced per

## 2020-06-26 NOTE — PAYOR COMM NOTE
--------------  ADMISSION REVIEW     Payor: [address] #:  [id number]  Authorization Number: [id number]    Admit date: 6/25/20  Admit time: 1853       Patient Seen in: [facility] Emergency Department    History   P PANEL (14) - Abnormal; Notable for the following components:       Result Value    Potassium 3.4 (*)     A/G Ratio 0.9 (*)      Disposition and Plan     Clinical Impression:  Cellulitis of right breast  (primary encounter diagnosis)    Disposition:  Admit LMP 11/27/2019   SpO2 97%   BMI 28.35 kg/m²      HEENT: Normocephalic atraumatic. Moist mucous membranes. EOM-I. PERRLA. Anicteric. Neck: No lymphadenopathy. No JVD. No carotid bruits. Respiratory: Clear to auscultation bilaterally. No wheezes.  No rhonch erythema was less intense though continued with fullness/pain, she was instructed to go to ER for further evaluation      Physical Exam:     Blood pressure 123/77, pulse 77, temperature 99.1 °F (37.3 °C), temperature source Temporal, resp.  rate 18, height 6/25/2020 1643 New Bag 1000 mL Intravenous (Left Antecubital)       Tamoxifen Citrate (NOLVADEX) tab 20 mg     Date Action Dose Route     6/26/2020 0938 Given 20 mg Oral

## 2020-06-27 VITALS
SYSTOLIC BLOOD PRESSURE: 106 MMHG | HEIGHT: 62 IN | TEMPERATURE: 98 F | HEART RATE: 65 BPM | RESPIRATION RATE: 16 BRPM | BODY MASS INDEX: 28.52 KG/M2 | WEIGHT: 155 LBS | OXYGEN SATURATION: 99 % | DIASTOLIC BLOOD PRESSURE: 58 MMHG

## 2020-06-27 PROCEDURE — 99239 HOSP IP/OBS DSCHRG MGMT >30: CPT | Performed by: HOSPITALIST

## 2020-06-27 NOTE — DISCHARGE SUMMARY
[facility]IATR[facility] [place] HOSPITALIST  DISCHARGE SUMMARY     Erin Cuenca Patient Status:  Inpatient    1970 MRN [id number]   [facility] 4NW-A Attending Owen Rae MD   Hosp Day # 2 PCP Tyrnoe Sherman MD     Date of Admission: 2020 · none    Consultants:  • Infectious Disease, General Surgery    Discharge Medication List:     Discharge Medications      START taking these medications      Instructions Prescription details   cephALEXin 500 MG Caps  Commonly known as:  Deondre Wiley rhythm. No murmurs, rubs or gallops. Abdomen: Soft, nontender, nondistended. Positive bowel sounds. No rebound or guarding. Neurologic: No focal neurological deficits. Musculoskeletal: Moves all extremities. Extremities: No edema.   -----------------

## 2020-06-27 NOTE — PROGRESS NOTES
Alert, orientated x4. Medicated with tylenol for headacche. Up independently. Reports swelling and redness have markedly decreased.

## 2020-06-27 NOTE — PROGRESS NOTES
[facility]  Progress Note    Harry Tan Patient Status:  Inpatient    1970 MRN [id number]   [facility] 4NW-A Attending Saran Dunn MD   [facility] Day # 2 PCP Shaquille Abebe MD     Subjective:  Patient is feeling much

## 2020-06-27 NOTE — PROGRESS NOTES
Pt.alert and oriented x 4;c/o headache not relieved with tylenol on shift rounds;  Erika Garcia rounded;informed with the headache;MD stated to give toradol;given et time;stated okay to discharge;just call dr. Jeff Linn rounded at this time too;okay di

## 2020-06-29 ENCOUNTER — VIRTUAL PHONE E/M (OUTPATIENT)
Dept: FAMILY MEDICINE CLINIC | Facility: CLINIC | Age: 50
End: 2020-06-29
Payer: COMMERCIAL

## 2020-06-29 DIAGNOSIS — N61.0 CELLULITIS OF RIGHT BREAST: ICD-10-CM

## 2020-06-29 DIAGNOSIS — N76.0 ACUTE VAGINITIS: Primary | ICD-10-CM

## 2020-06-29 DIAGNOSIS — Z17.0 MALIGNANT NEOPLASM OF UPPER-OUTER QUADRANT OF RIGHT BREAST IN FEMALE, ESTROGEN RECEPTOR POSITIVE (HCC): ICD-10-CM

## 2020-06-29 DIAGNOSIS — C50.411 MALIGNANT NEOPLASM OF UPPER-OUTER QUADRANT OF RIGHT BREAST IN FEMALE, ESTROGEN RECEPTOR POSITIVE (HCC): ICD-10-CM

## 2020-06-29 PROCEDURE — 99213 OFFICE O/P EST LOW 20 MIN: CPT | Performed by: NURSE PRACTITIONER

## 2020-06-29 PROCEDURE — 1111F DSCHRG MED/CURRENT MED MERGE: CPT | Performed by: NURSE PRACTITIONER

## 2020-06-29 RX ORDER — FLUCONAZOLE 150 MG/1
150 TABLET ORAL ONCE
Qty: 1 TABLET | Refills: 1 | Status: SHIPPED | OUTPATIENT
Start: 2020-06-29 | End: 2020-06-29

## 2020-06-29 NOTE — PAYOR COMM NOTE
--------------  DISCHARGE REVIEW    Payor: [address] #:  [id number]  Authorization Number: [id number]    Admit date: 6/25/20  Admit time:  1853  Discharge Date: 6/27/2020 11:00 AM     Admitting Physician: Gail Caputo antibiotics with outpatient surgery follow-up. Lace+ Score: 57  59-90 High Risk  29-58 Medium Risk  0-28   Low Risk         TCM Follow-Up Recommendation:  LACE 29-58:  Moderate Risk of readmission after discharge from the hospital.    Procedures during h [address]  [phone]    In 1 week      Appointments for Next 30 Days 6/27/2020 - 7/27/2020    None          Vital signs:  Temp:  [97.6 °F (36.4 °C)-99.4 °F (37.4 °C)] 97.8 °F (36.6 °C)  Pulse:  [65-81] 65  Resp:  [16-20] 16  BP: ()/(58-69) 106/58    P

## 2020-06-29 NOTE — PROGRESS NOTES
Due to the real risk of possible exposure to Coronavirus (CoV-2, COVID-19) in the office/medical building and recommendations for social distancing (key to mitigation/limiting the spread of the virus) a Virtual or Telemedicine visit over the phone was perf Coding/billing information is submitted for this visit based on complexity of care and/or time spent for the visit.       HPI:  Patient presents with:  Acute: Yeast infection symptoms, cellulitis      Ky Brood is a 45year old female who calls for c shortness of breath. GI: Denies n/v/c, admits to 4-5 loose stools per day since starting PO antibiotics. Denies foul odor to stools, no blood in stools. Appetite/PO fluids normal for her. : Denies hematuria, urinary frequency.  Reports some external bur LUMPECTOMY RIGHT Right 10/2019    [facility]   • OTHER SURGICAL HISTORY  10/16/2019    Right breast lumpectomy, sln bx   • RADIATION RIGHT Right 12/2019   • TUBAL LIGATION           ASSESSMENT AND PLAN:   1.  Patient is a 45year old female who calls for: yeast in were placed in this encounter.       Meds & Refills for this Visit:  Requested Prescriptions      No prescriptions requested or ordered in this encounter       Imaging & Consults:  None    SHELLY Marley     Time spent during encounter: 15 minutes

## 2020-07-22 NOTE — CDS QUERY
Timothy Alejandro  1970 [id number]  account # [de-identified]  Admitted -2020 [facility] [id number]    Clarification – Procedure Documentation   Lauri Johnson  Dear ,  Clinical information in the patient's medical record (pro [id number]. [email or web address]     THIS FORM IS A PERMANENT PART OF THE MEDICAL RECORD

## 2020-08-18 ENCOUNTER — OFFICE VISIT (OUTPATIENT)
Dept: FAMILY MEDICINE CLINIC | Facility: CLINIC | Age: 50
End: 2020-08-18
Payer: COMMERCIAL

## 2020-08-18 VITALS
WEIGHT: 158 LBS | SYSTOLIC BLOOD PRESSURE: 100 MMHG | DIASTOLIC BLOOD PRESSURE: 60 MMHG | OXYGEN SATURATION: 98 % | BODY MASS INDEX: 28 KG/M2 | RESPIRATION RATE: 16 BRPM | HEART RATE: 80 BPM | HEIGHT: 63 IN

## 2020-08-18 DIAGNOSIS — Z13.820 SCREENING FOR OSTEOPOROSIS: ICD-10-CM

## 2020-08-18 DIAGNOSIS — C50.411 MALIGNANT NEOPLASM OF UPPER-OUTER QUADRANT OF RIGHT BREAST IN FEMALE, ESTROGEN RECEPTOR POSITIVE (HCC): ICD-10-CM

## 2020-08-18 DIAGNOSIS — Z17.0 MALIGNANT NEOPLASM OF UPPER-OUTER QUADRANT OF RIGHT BREAST IN FEMALE, ESTROGEN RECEPTOR POSITIVE (HCC): ICD-10-CM

## 2020-08-18 DIAGNOSIS — M77.8 TENDINITIS OF LEFT ELBOW: ICD-10-CM

## 2020-08-18 DIAGNOSIS — M54.50 CHRONIC BILATERAL LOW BACK PAIN WITHOUT SCIATICA: Primary | ICD-10-CM

## 2020-08-18 DIAGNOSIS — G89.29 CHRONIC BILATERAL LOW BACK PAIN WITHOUT SCIATICA: Primary | ICD-10-CM

## 2020-08-18 PROCEDURE — 3078F DIAST BP <80 MM HG: CPT | Performed by: NURSE PRACTITIONER

## 2020-08-18 PROCEDURE — 99213 OFFICE O/P EST LOW 20 MIN: CPT | Performed by: NURSE PRACTITIONER

## 2020-08-18 PROCEDURE — 3008F BODY MASS INDEX DOCD: CPT | Performed by: NURSE PRACTITIONER

## 2020-08-18 PROCEDURE — 3074F SYST BP LT 130 MM HG: CPT | Performed by: NURSE PRACTITIONER

## 2020-08-18 RX ORDER — NAPROXEN 500 MG/1
500 TABLET ORAL 2 TIMES DAILY WITH MEALS
Qty: 28 TABLET | Refills: 0 | Status: SHIPPED | OUTPATIENT
Start: 2020-08-18 | End: 2020-09-01

## 2020-08-18 NOTE — PATIENT INSTRUCTIONS
Please call Central Scheduling at [phone] to schedule your test.  -Heating pad to back 10-20 minutes at a time, prior to stretching  -Naproxen twice daily for pain/inflammation, take with food. Do not use ibuprofen in addition to this.  May take Ty 3. Abdominal contraction. Bend your knees and put your hands on your stomach. Tighten your stomach muscles. Hold for 5 seconds, then relax. Repeat 10 times. 4. Straight leg raise. Bend one leg at the knee and keep the other leg straight.  Tighten your stom 3. Pelvic tilt. Lie on the floor on your back with your knees bent at 90 degrees. Your feet should be flat on the floor. Inhale, exhale, then slowly contract your abdominal muscles bringing your navel toward your spine.  Let your pelvis rock back until your Do this exercise on your hands and knees. Keep your knees under your hips and your hands under your shoulders. Keep your spine in a neutral position (not arched or sagging).  Be sure to maintain your neck’s natural curve:  · Stretch one arm straight out in Aleksandra last reviewed this educational content on 3/1/2018  © 5581-2666 [employer] [address]. [address] [facility], [facility] [place]. All rights reserved. This information is not intended as a substitute for professional medical care.  Always follo © 7231-7467 [employer] [address]. [address] [facility], [address] [place]. All rights reserved. This information is not intended as a substitute for professional medical care. Always follow your healthcare professional's instructions.         Back Ex Do this exercise on your hands and knees. Keep your knees under your hips and your hands under your shoulders. Keep your spine in a neutral position (not arched or sagging). Be sure to maintain your neck’s natural curve:  · Extend one leg straight back.  Do Note: If you've had back or hip surgery, talk with your healthcare provider before doing this stretch. Aleksandra last reviewed this educational content on 4/1/2020  © 2801-0845 [employer] [address]. [address] [facility], [address] [place] [address].  [facility] edgar

## 2020-08-18 NOTE — PROGRESS NOTES
Chief Complaint:   Patient presents with:  Back Pain: Lower back pain. Does not recall trauma to the back. HPI:   This is a 45year old female coming in for low back pain, L elbow pain.     Lower back pain: present on both sides of spine, does not rad 56 U/L    Alkaline Phosphatase 50 41 - 108 U/L    Bilirubin, Total 0.4 0.1 - 2.0 mg/dL    Total Protein 8.0 6.4 - 8.2 g/dL    Albumin 3.7 3.4 - 5.0 g/dL    Globulin  4.3 2.8 - 4.4 g/dL    A/G Ratio 0.9 (L) 1.0 - 2.0   LACTIC ACID, PLASMA   Result Value Ref glasses/contacts     Past Surgical History:   Procedure Laterality Date   • BENIGN BIOPSY LEFT Left 2019   • BREAST SENTINEL LYMPH NODE BIOPSY Right 10/7/2019    Performed by Rian Dewitt MD at [facility] MAIN OR   •       x3   • HYSTEROSCOPY WITH E shortness of breath, wheezing, cough or sputum. GASTROINTESTINAL:  Denies abdominal pain, nausea, vomiting, diarrhea, or blood in stool. Hx chronic constipation, takes benefiber, reports she often has a BM every 3-5 days. Passes flatus normally.   Seth Alamo some pain with full extension. NEURO:  No deficit, normal gait, strength and tone, sensory intact. ASSESSMENT AND PLAN:   1.  Chronic bilateral low back pain without sciatica  -Will check X-ray based on patient's hx of breast cancer.   -If negative, rec Colorectal Screening due on 08/29/2020      Problem List:  Patient Active Problem List:     Disorders of bursae and tendons in shoulder region, unspecified     Subacute vulvitis     Urinary, incontinence, stress female     Abnormal bilirubin test     Intri

## 2020-08-21 ENCOUNTER — OFFICE VISIT (OUTPATIENT)
Dept: FAMILY MEDICINE CLINIC | Facility: CLINIC | Age: 50
End: 2020-08-21

## 2020-08-21 DIAGNOSIS — R23.8 INTRINSIC AGING OF FACIAL SKIN: ICD-10-CM

## 2020-08-21 DIAGNOSIS — L57.0 AK (ACTINIC KERATOSIS): Primary | ICD-10-CM

## 2020-08-21 DIAGNOSIS — R23.8 INTRINSIC AGING OF FACIAL SKIN: Primary | ICD-10-CM
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
## 2020-08-21 NOTE — PROCEDURES
HPI:   Patient presents with:  Derm Problem       Khloe Crandall is a 45year old female who presents for Cosmetic Microdermabrasion (#7) for normal skin aging effects. She is without other new complaints and concerns. See ROS below.     Probiotic Prod SYSTEMS:   Pertinent positives and negatives noted in the the HPI.  Specifically:  GEN:  No fever or fatigue  HEAD:  No headaches, no dizziness  EYES:  No vision change or complaints  EARS:  No hearing loss, no ear pain  MOUTH/THROAT:  No sore throat, no or or next OV    2. Excess sun exposure  SA    3. Intrinsic aging of facial skin  SA      The patient indicates understanding of the above recommendations and agrees to the above plan.   Follow up: as above    No orders of the defined types were placed in this

## 2020-08-21 NOTE — PROCEDURES
HPI:   Patient presents with:  Derm Problem: botox       Dain Desai is a 45year old female who presents for cosmetic facial BOTOX injections for normal skin aging effects. She is without other complaints and concerns. See ROS below.     Probiotic no discharge  NECK: supple  EXT:no CCE  MS: grossly normal  B UE and LE  NEURO: A&O x 3, CN II-XII wnl, motor and sensory grossly wnl B UE and LE   PSYCH: mood and affect WNL,  speech clear and congruent with thoughts    ASSESSMENT AND PLAN:   1.  Patient i

## 2020-08-23 ENCOUNTER — HOSPITAL ENCOUNTER (OUTPATIENT)
Dept: GENERAL RADIOLOGY | Age: 50
Discharge: HOME | End: 2020-08-23
Attending: NURSE PRACTITIONER
Payer: COMMERCIAL

## 2020-08-23 DIAGNOSIS — M54.50 CHRONIC BILATERAL LOW BACK PAIN WITHOUT SCIATICA: ICD-10-CM

## 2020-08-23 DIAGNOSIS — G89.29 CHRONIC BILATERAL LOW BACK PAIN WITHOUT SCIATICA: ICD-10-CM

## 2020-08-23 PROCEDURE — 72110 X-RAY EXAM L-2 SPINE 4/>VWS: CPT | Performed by: NURSE PRACTITIONER

## 2020-08-26 PROBLEM — Z91.89 AT HIGH RISK FOR BREAST CANCER: Status: RESOLVED | Noted: 2019-07-23 | Resolved: 2020-08-26

## 2020-09-02 ENCOUNTER — TELEPHONE (OUTPATIENT)
Dept: FAMILY MEDICINE CLINIC | Facility: CLINIC | Age: 50
End: 2020-09-02

## 2020-09-02 NOTE — TELEPHONE ENCOUNTER
Per documentation, pt had two appts on 8/21/20. One for Botox and one for Microderm. Pt was charged 330.00 for each but paid 330.00 for one and 100.00 for the other. The microderm charge needs to be corrected to reflect 1 service at 100.00.

## 2020-09-02 NOTE — TELEPHONE ENCOUNTER
From what I can see everything should have been okay, we do have the charge and receipt on file for both microderm and botox. I will follow up with billing to investigate further.  I am curious how she received a bill already when the month of August was ju

## 2020-09-03 NOTE — TELEPHONE ENCOUNTER
LVM for pt explaining we are working on this error and it will be corrected. Pt was informed to not pay the outstanding $230 charge she received for [employer], as this is incorrect.

## 2020-10-16 ENCOUNTER — PATIENT MESSAGE (OUTPATIENT)
Dept: FAMILY MEDICINE CLINIC | Facility: CLINIC | Age: 50
End: 2020-10-16

## 2020-10-17 ENCOUNTER — OFFICE VISIT (OUTPATIENT)
Dept: FAMILY MEDICINE CLINIC | Facility: CLINIC | Age: 50
End: 2020-10-17
Payer: COMMERCIAL

## 2020-10-17 VITALS
WEIGHT: 157 LBS | HEIGHT: 62.6 IN | SYSTOLIC BLOOD PRESSURE: 112 MMHG | RESPIRATION RATE: 16 BRPM | DIASTOLIC BLOOD PRESSURE: 80 MMHG | BODY MASS INDEX: 28.17 KG/M2 | OXYGEN SATURATION: 99 % | HEART RATE: 65 BPM

## 2020-10-17 DIAGNOSIS — H60.392 OTHER INFECTIVE ACUTE OTITIS EXTERNA OF LEFT EAR: Primary | ICD-10-CM

## 2020-10-17 DIAGNOSIS — J30.2 SEASONAL ALLERGIES: ICD-10-CM

## 2020-10-17 DIAGNOSIS — H65.93 MIDDLE EAR EFFUSION, BILATERAL: ICD-10-CM

## 2020-10-17 PROCEDURE — 3079F DIAST BP 80-89 MM HG: CPT | Performed by: NURSE PRACTITIONER

## 2020-10-17 PROCEDURE — 3008F BODY MASS INDEX DOCD: CPT | Performed by: NURSE PRACTITIONER

## 2020-10-17 PROCEDURE — 3074F SYST BP LT 130 MM HG: CPT | Performed by: NURSE PRACTITIONER

## 2020-10-17 PROCEDURE — 99213 OFFICE O/P EST LOW 20 MIN: CPT | Performed by: NURSE PRACTITIONER

## 2020-10-17 RX ORDER — COLISTIN SULFATE, NEOMYCIN SULFATE, THONZONIUM BROMIDE AND HYDROCORTISONE ACETATE 3; 3.3; .5; 1 MG/ML; MG/ML; MG/ML; MG/ML
4 SUSPENSION AURICULAR (OTIC) 3 TIMES DAILY
Qty: 1 BOTTLE | Refills: 0 | Status: SHIPPED | OUTPATIENT
Start: 2020-10-17 | End: 2020-10-27

## 2020-10-17 NOTE — PROGRESS NOTES
Chief Complaint:   Patient presents with:  Ear Pain: pain in both ears x 2 weeks. patient states the left ear is draining in the past 2 days. HPI:   This is a 45year old female coming in for pain/discharge from L external ear x a few days.  Also repo 10/2019    [facility]   • OTHER SURGICAL HISTORY  10/16/2019    Right breast lumpectomy, sln bx   • RADIATION RIGHT Right 12/2019   • TUBAL LIGATION       Social History:  Social History    Tobacco Use      Smoking status: Never Smoker      Smokeless tobacco: Nev seizures, dizziness, syncope, ataxia, numbness or tingling in the extremities,change in bowel or bladder control.     EXAM:   /80   Pulse 65   Resp 16   Ht 62.6\"   Wt 157 lb (71.2 kg)   LMP 07/25/2020 (Approximate)   SpO2 99%   BMI 28.17 kg/m²  Estim (three) times daily for 10 days. Dispense: 1 Bottle; Refill: 0    2. Middle ear effusion, bilateral  -Likely related to allergies. Recommended OTC Claritin or Zyrtec 1 tab PO daily x at least 2 weeks, may continue longer if needed.  Recommended Flonase 1 s

## 2020-10-17 NOTE — PATIENT INSTRUCTIONS
-Claritin or Zyrtec 1 tablet by mouth daily x 2 weeks, longer if needed  -Flonase 1 spray in each nostril twice daily x 5-7 days, then once daily. Think \"nose to toes\" and rinse mouth after each use.      External Ear Infection (Adult)        External o · If you feel water trapped in your ear, use ear drops right away. You can get these drops over the counter at most drugstores.  They work by removing water from the ear canal.  Follow-up care  Follow up with your healthcare provider in 1 week, or as advise

## 2020-11-27 ENCOUNTER — OFFICE VISIT (OUTPATIENT)
Dept: FAMILY MEDICINE CLINIC | Facility: CLINIC | Age: 50
End: 2020-11-27

## 2020-11-27 DIAGNOSIS — L21.9 SEBORRHEIC DERMATITIS: ICD-10-CM

## 2020-11-27 DIAGNOSIS — L29.9 ITCHING OF EAR: ICD-10-CM

## 2020-11-27 DIAGNOSIS — L57.0 AK (ACTINIC KERATOSIS): ICD-10-CM

## 2020-11-27 DIAGNOSIS — R23.8 INTRINSIC AGING OF FACIAL SKIN: Primary | ICD-10-CM

## 2020-11-27 NOTE — PROCEDURES
HPI:   No chief complaint on file. Bindu Whitt is a 45year old female who presents for cosmetic facial microdermabrasion and BOTOX injections for normal skin aging effects. See ROS below.       •  Clobetasol Propionate 0.05 % External Ointmen erythematous, irregular patches, few with mild hyperpigmentation, all <0.5 cm  Skin color wnl, Normal signs of skin aging  HEENT: atraumatic, normocephalic, ears: B canal mildly erythematous, TM carson bulging  EYES:PERRLA, EOMI, conjunctiva are clear, no d

## 2020-12-30 ENCOUNTER — OFFICE VISIT (OUTPATIENT)
Dept: FAMILY MEDICINE CLINIC | Facility: CLINIC | Age: 50
End: 2020-12-30
Payer: COMMERCIAL

## 2020-12-30 VITALS
SYSTOLIC BLOOD PRESSURE: 122 MMHG | HEIGHT: 62.6 IN | BODY MASS INDEX: 28.67 KG/M2 | HEART RATE: 75 BPM | WEIGHT: 159.81 LBS | OXYGEN SATURATION: 95 % | DIASTOLIC BLOOD PRESSURE: 84 MMHG

## 2020-12-30 DIAGNOSIS — C50.411 MALIGNANT NEOPLASM OF UPPER-OUTER QUADRANT OF RIGHT BREAST IN FEMALE, ESTROGEN RECEPTOR POSITIVE (HCC): ICD-10-CM

## 2020-12-30 DIAGNOSIS — Z23 FLU VACCINE NEED: ICD-10-CM

## 2020-12-30 DIAGNOSIS — R32 URINARY INCONTINENCE IN FEMALE: ICD-10-CM

## 2020-12-30 DIAGNOSIS — Z17.0 MALIGNANT NEOPLASM OF UPPER-OUTER QUADRANT OF RIGHT BREAST IN FEMALE, ESTROGEN RECEPTOR POSITIVE (HCC): ICD-10-CM

## 2020-12-30 DIAGNOSIS — G89.29 CHRONIC RIGHT-SIDED LOW BACK PAIN WITH RIGHT-SIDED SCIATICA: Primary | ICD-10-CM

## 2020-12-30 DIAGNOSIS — M25.559 HIP PAIN: ICD-10-CM

## 2020-12-30 DIAGNOSIS — M54.41 CHRONIC RIGHT-SIDED LOW BACK PAIN WITH RIGHT-SIDED SCIATICA: Primary | ICD-10-CM

## 2020-12-30 LAB
MULTISTIX LOT#: 1044 NUMERIC
PH, URINE: 7 (ref 4.5–8)
SPECIFIC GRAVITY: 1.01 (ref 1–1.03)
URINE-COLOR: YELLOW
UROBILINOGEN,SEMI-QN: 0.2 MG/DL (ref 0–1.9)

## 2020-12-30 PROCEDURE — 81003 URINALYSIS AUTO W/O SCOPE: CPT | Performed by: FAMILY MEDICINE

## 2020-12-30 PROCEDURE — 90471 IMMUNIZATION ADMIN: CPT | Performed by: FAMILY MEDICINE

## 2020-12-30 PROCEDURE — 3008F BODY MASS INDEX DOCD: CPT | Performed by: FAMILY MEDICINE

## 2020-12-30 PROCEDURE — 99214 OFFICE O/P EST MOD 30 MIN: CPT | Performed by: FAMILY MEDICINE

## 2020-12-30 PROCEDURE — 3074F SYST BP LT 130 MM HG: CPT | Performed by: FAMILY MEDICINE

## 2020-12-30 PROCEDURE — 3079F DIAST BP 80-89 MM HG: CPT | Performed by: FAMILY MEDICINE

## 2020-12-30 PROCEDURE — 90686 IIV4 VACC NO PRSV 0.5 ML IM: CPT | Performed by: FAMILY MEDICINE

## 2020-12-30 RX ORDER — METHYLPREDNISOLONE 4 MG/1
TABLET ORAL
Qty: 1 KIT | Refills: 0 | Status: SHIPPED | OUTPATIENT
Start: 2020-12-30 | End: 2021-01-20

## 2020-12-30 RX ORDER — DICLOFENAC SODIUM 75 MG/1
75 TABLET, DELAYED RELEASE ORAL 2 TIMES DAILY
Qty: 60 TABLET | Refills: 1 | Status: SHIPPED | OUTPATIENT
Start: 2020-12-30 | End: 2021-03-05

## 2021-01-01 NOTE — PROGRESS NOTES
Nathan Tan,    Below are the results of your recently performed labs/tests: All results are within NORMAL limits. Follow up: With me as needed.     Take care,     Shaquille Abebe MD  1/1/2021    (Report(s) are attached, future lab/te

## 2021-01-02 NOTE — PROGRESS NOTES
HPI:   Patient presents with:  Low Back Pain: x 2 mo   Hip Pain: R hip x 2 mo   Incontinence      Khloe Crandall is a 45year old female who is here for complaints of back pain.     Pain is located at R lower back, gluteal areas  Duration of pain: months, BENIGN BIOPSY LEFT Left 2020    us bx   • BREAST SENTINEL LYMPH NODE BIOPSY Right 10/7/2019    Performed by Duc Galvez MD at [address] [place] [address]   •       x3   • HYSTEROSCOPY WITH ENDOMETRIAL ABLATION N/A 12/15/2015    Performed by Jeremie Gilbert R lower lumbar/sacral and tenderness-mild, straight leg raise: neg B, mild tenderness R lat hip, hip rotation wnl B-no pain or hypermobility on exam  NEURO: LE strength 5/5 bilaterally, gross sensation intact, patellar reflex 2/4 bilaterally    ASSESSMENT URINALYSIS, AUTO, W/O SCOPE    4. Malignant neoplasm of upper-outer quadrant of right breast in female, estrogen receptor positive ([facility] [address]  Dx 5/2020, sees Onc, on tamoxifen    5.  Flu vaccine need  Given today  - FLULAVAL INFLUENZA VACCINE QUAD PRESERVATIVE

## 2021-01-06 ENCOUNTER — TELEPHONE (OUTPATIENT)
Dept: FAMILY MEDICINE CLINIC | Facility: CLINIC | Age: 51
End: 2021-01-06

## 2021-01-06 DIAGNOSIS — M54.41 CHRONIC RIGHT-SIDED LOW BACK PAIN WITH RIGHT-SIDED SCIATICA: Primary | ICD-10-CM

## 2021-01-06 DIAGNOSIS — G89.29 CHRONIC RIGHT-SIDED LOW BACK PAIN WITH RIGHT-SIDED SCIATICA: Primary | ICD-10-CM

## 2021-01-06 DIAGNOSIS — R32 URINARY INCONTINENCE IN FEMALE: ICD-10-CM

## 2021-01-06 NOTE — TELEPHONE ENCOUNTER
Rceived fax from [facility] imaging asking to have order adjusted from a MRI with contrast to a MRI WITHOUT contrast (CPT 43444 confirmed). New order pended using the same specifications; please confirm and sign.

## 2021-01-11 NOTE — PROGRESS NOTES
Nathan Edmonds,    Attached are the results of your recently performed labs/tests: All results are within NORMAL limits EXCEPT:    Mild to moderate neural foraminal narrowing was noted at L5-S1 and L4–L5.   No central spinal canal stenosis or narrow

## 2021-01-20 ENCOUNTER — OFFICE VISIT (OUTPATIENT)
Dept: FAMILY MEDICINE CLINIC | Facility: CLINIC | Age: 51
End: 2021-01-20
Payer: COMMERCIAL

## 2021-01-20 VITALS
OXYGEN SATURATION: 96 % | BODY MASS INDEX: 28.89 KG/M2 | WEIGHT: 161 LBS | SYSTOLIC BLOOD PRESSURE: 112 MMHG | HEART RATE: 86 BPM | HEIGHT: 62.6 IN | DIASTOLIC BLOOD PRESSURE: 66 MMHG

## 2021-01-20 DIAGNOSIS — L57.0 AK (ACTINIC KERATOSIS): Primary | ICD-10-CM

## 2021-01-20 DIAGNOSIS — L98.9 SKIN LESION OF CHEEK: ICD-10-CM

## 2021-01-20 PROCEDURE — 3074F SYST BP LT 130 MM HG: CPT | Performed by: FAMILY MEDICINE

## 2021-01-20 PROCEDURE — 17003 DESTRUCT PREMALG LES 2-14: CPT | Performed by: FAMILY MEDICINE

## 2021-01-20 PROCEDURE — 3078F DIAST BP <80 MM HG: CPT | Performed by: FAMILY MEDICINE

## 2021-01-20 PROCEDURE — 17000 DESTRUCT PREMALG LESION: CPT | Performed by: FAMILY MEDICINE

## 2021-01-20 PROCEDURE — 3008F BODY MASS INDEX DOCD: CPT | Performed by: FAMILY MEDICINE

## 2021-01-20 NOTE — PROCEDURES
HPI:  Patient presents with:  Back Pain  Derm Problem      Cheli Hutchison is a 45year old female who presents for skin lesion(s). Located on face. Has had for years  Described as recurrent. The probable cause is unknown-sun exposure.   Has tried cryo no apparent distress  SKIN: Face-location: bilateral perioral, maxillary , R forehead and temple: #10+total: Scattered, slightly raised dry, flaky, mildly erythematous, irregular patches, all <0.5 cm  R maxillary 3 x 4 mm sl raised pink patch w raised edge

## 2021-02-24 ENCOUNTER — OFFICE VISIT (OUTPATIENT)
Dept: DERMATOLOGY CLINIC | Facility: CLINIC | Age: 51
End: 2021-02-24
Payer: COMMERCIAL

## 2021-02-24 DIAGNOSIS — L82.1 SEBORRHEIC KERATOSES: ICD-10-CM

## 2021-02-24 DIAGNOSIS — L57.0 ACTINIC KERATOSIS: Primary | ICD-10-CM

## 2021-02-24 PROCEDURE — 99203 OFFICE O/P NEW LOW 30 MIN: CPT | Performed by: PHYSICIAN ASSISTANT

## 2021-02-24 PROCEDURE — 17000 DESTRUCT PREMALG LESION: CPT | Performed by: PHYSICIAN ASSISTANT

## 2021-02-24 PROCEDURE — 17003 DESTRUCT PREMALG LES 2-14: CPT | Performed by: PHYSICIAN ASSISTANT

## 2021-02-24 PROCEDURE — 17110 DESTRUCTION B9 LES UP TO 14: CPT | Performed by: PHYSICIAN ASSISTANT

## 2021-02-24 NOTE — PROGRESS NOTES
HPI:    Patient ID: Griselda Vergara is a 45year old female. Patient presents for spot on her left cheek and started about 4 months ago. Getting larger. She does get microabrasion done routinely. She does not have itching.  She does not have itching or is a sebeorrheic keratosis. Some roughness and even pigmentation noted. AK noted on upper left breast. Some scaling. Scar noted from surgery lateral to lesion. No draining. No tenderness. Some roughness on palpation noted.      AK noted on upper left ar

## 2021-03-05 ENCOUNTER — OFFICE VISIT (OUTPATIENT)
Dept: FAMILY MEDICINE CLINIC | Facility: CLINIC | Age: 51
End: 2021-03-05
Payer: COMMERCIAL

## 2021-03-05 VITALS
DIASTOLIC BLOOD PRESSURE: 78 MMHG | SYSTOLIC BLOOD PRESSURE: 122 MMHG | WEIGHT: 154.63 LBS | OXYGEN SATURATION: 98 % | HEIGHT: 62.5 IN | HEART RATE: 78 BPM | BODY MASS INDEX: 27.74 KG/M2

## 2021-03-05 DIAGNOSIS — N76.0 VAGINITIS AND VULVOVAGINITIS: Primary | ICD-10-CM

## 2021-03-05 DIAGNOSIS — L30.9 DERMATITIS: ICD-10-CM

## 2021-03-05 DIAGNOSIS — R30.0 DYSURIA: ICD-10-CM

## 2021-03-05 LAB
APPEARANCE: CLEAR
BILIRUBIN: NEGATIVE
GLUCOSE (URINE DIPSTICK): NEGATIVE MG/DL
KETONES (URINE DIPSTICK): NEGATIVE MG/DL
LEUKOCYTES: NEGATIVE
MULTISTIX LOT#: 5077 NUMERIC
NITRITE, URINE: NEGATIVE
OCCULT BLOOD: NEGATIVE
PH, URINE: 6.5 (ref 4.5–8)
PROTEIN (URINE DIPSTICK): NEGATIVE MG/DL
SPECIFIC GRAVITY: 1.01 (ref 1–1.03)
UROBILINOGEN,SEMI-QN: 0.2 MG/DL (ref 0–1.9)

## 2021-03-05 PROCEDURE — 87086 URINE CULTURE/COLONY COUNT: CPT | Performed by: FAMILY MEDICINE

## 2021-03-05 PROCEDURE — 81003 URINALYSIS AUTO W/O SCOPE: CPT | Performed by: FAMILY MEDICINE

## 2021-03-05 PROCEDURE — 3074F SYST BP LT 130 MM HG: CPT | Performed by: FAMILY MEDICINE

## 2021-03-05 PROCEDURE — 99214 OFFICE O/P EST MOD 30 MIN: CPT | Performed by: FAMILY MEDICINE

## 2021-03-05 PROCEDURE — 3008F BODY MASS INDEX DOCD: CPT | Performed by: FAMILY MEDICINE

## 2021-03-05 PROCEDURE — 3078F DIAST BP <80 MM HG: CPT | Performed by: FAMILY MEDICINE

## 2021-03-05 RX ORDER — FLUCONAZOLE 150 MG/1
TABLET ORAL
Qty: 4 TABLET | Refills: 1 | Status: SHIPPED | OUTPATIENT
Start: 2021-03-05

## 2021-03-05 RX ORDER — NYSTATIN 100000 U/G
1 OINTMENT TOPICAL 2 TIMES DAILY
Qty: 1 TUBE | Refills: 1 | Status: SHIPPED | OUTPATIENT
Start: 2021-03-05

## 2021-03-08 NOTE — PROGRESS NOTES
HPI:   Patient presents with:  Dysuria: c/o painful when urinating all the time  Derm Problem      Dain Desai is a 45year old female.     She primarily presents for:    Burning with urination, localized, vulvar/vaginal  Had seen GYN in the past for Citrate 20 MG Oral Tab Take 1 tablet (20 mg total) by mouth daily.  90 tablet 1      Past Medical History:   Diagnosis Date   • [facility]    Right breast IDC   • Tinnitus    • Visual impairment     glasses/contacts         Past Surgical Hi nourished, female  in no apparent distress  SKIN: skin color wnl  HEENT: atraumatic, normocephalic  EYES: PERRLA, EOMI,conjunctiva clear, no discharge B  GI: NABS, soft, nondistended, no masses, no HSM, no tenderness  /GYNE: Perennial area/inferior to no SCOPE  - URINE CULTURE, ROUTINE      The patient indicates understanding of the above recommendations and agrees to the above plan.   Follow up: as above    Orders Placed This Encounter      POC Urinalysis-in OFFICE dip      Urine Culture, Routine [E]

## 2021-04-23 ENCOUNTER — OFFICE VISIT (OUTPATIENT)
Dept: FAMILY MEDICINE CLINIC | Facility: CLINIC | Age: 51
End: 2021-04-23

## 2021-04-23 DIAGNOSIS — L57.0 AK (ACTINIC KERATOSIS): ICD-10-CM

## 2021-04-23 DIAGNOSIS — R23.8 INTRINSIC AGING OF FACIAL SKIN: Primary | ICD-10-CM

## 2021-04-24 NOTE — PROCEDURES
HPI:        Jose M Daigle is a 45year old female who presents for cosmetic facial microdermabrasion and BOTOX injections for normal skin aging effects. She is without other complaints and concerns. See ROS below.     Esomeprazole Magnesium 20 MG Ora normocephalic  EYES:PERRLA, EOMI, conjunctiva are clear, no discharge  NECK: supple  EXT:no CCE  MS: grossly normal  B UE and LE  NEURO: A&O x 3, CN II-XII wnl, motor and sensory grossly wnl B UE and LE   PSYCH: mood and affect WNL,  speech clear and congr

## 2021-05-26 VITALS
TEMPERATURE: 98 F | HEART RATE: 96 BPM | SYSTOLIC BLOOD PRESSURE: 108 MMHG | RESPIRATION RATE: 20 BRPM | OXYGEN SATURATION: 97 % | DIASTOLIC BLOOD PRESSURE: 66 MMHG

## 2021-08-13 ENCOUNTER — OFFICE VISIT (OUTPATIENT)
Dept: FAMILY MEDICINE CLINIC | Facility: CLINIC | Age: 51
End: 2021-08-13

## 2021-08-13 ENCOUNTER — OFFICE VISIT (OUTPATIENT)
Dept: FAMILY MEDICINE CLINIC | Facility: CLINIC | Age: 51
End: 2021-08-13
Payer: COMMERCIAL

## 2021-08-13 DIAGNOSIS — R23.8 INTRINSIC AGING OF FACIAL SKIN: Primary | ICD-10-CM

## 2021-08-13 DIAGNOSIS — L57.0 AK (ACTINIC KERATOSIS): ICD-10-CM

## 2021-08-13 DIAGNOSIS — L57.0 AK (ACTINIC KERATOSIS): Primary | ICD-10-CM

## 2021-08-13 PROCEDURE — 17003 DESTRUCT PREMALG LES 2-14: CPT | Performed by: FAMILY MEDICINE

## 2021-08-13 PROCEDURE — 17000 DESTRUCT PREMALG LESION: CPT | Performed by: FAMILY MEDICINE

## 2021-08-15 NOTE — PROCEDURES
HPI:  Patient presents with:  Derm Problem      Primitivo Monday is a 45year old female who presents for skin lesion(s). Located on face. Has had for months  Described as recurrent. The probable cause is excess sun.   Has tried cryo with partial relief numbness or tingling or weakness  PSYCH:  No mood concerns    EXAM:  LMP 02/17/2021 (Exact Date)   GENERAL: well developed, well nourished, female in no apparent distress  SKIN: Face-location: bilateral maxillary, R temple, perioral : #6+ total: Scattered,

## 2021-08-15 NOTE — PROCEDURES
HPI:   Patient presents with:  Derm Problem: BOTOX microderm       Priyanka Herron is a 45year old female who presents for cosmetic facial microdermabrasion (#3) and BOTOX injections for normal skin aging effects.     She is without other complaints and c joint pain or swelling B  NEURO:  Denies numbness or tingling or weakness  PSYCH:  No mood concerns    EXAM:   LMP 02/17/2021 (Exact Date)  Estimated body mass index is 29.52 kg/m² as calculated from the following:    Height as of 6/4/21: 5' 1.34\" (1.558

## 2021-11-30 ENCOUNTER — OFFICE VISIT (OUTPATIENT)
Dept: FAMILY MEDICINE CLINIC | Facility: CLINIC | Age: 51
End: 2021-11-30

## 2021-11-30 DIAGNOSIS — R23.8 INTRINSIC AGING OF FACIAL SKIN: Primary | ICD-10-CM

## 2021-12-01 NOTE — PROCEDURES
HPI:   Patient presents with:  Derm Problem       Ellouise Sandhoff is a 45year old female who presents for cosmetic facial microdermabrasion and BOTOX injections for normal skin aging effects. She is without other complaints and concerns.   See MO mares swelling B  NEURO:  Denies numbness or tingling or weakness  PSYCH:  No mood concerns    EXAM:   LMP 02/17/2021 (Exact Date)  Estimated body mass index is 29.52 kg/m² as calculated from the following:    Height as of 6/4/21: 5' 1.34\" (1.558 m).     Weight

## 2021-12-06 DIAGNOSIS — M54.41 CHRONIC RIGHT-SIDED LOW BACK PAIN WITH RIGHT-SIDED SCIATICA: ICD-10-CM

## 2021-12-06 DIAGNOSIS — M25.559 HIP PAIN: ICD-10-CM

## 2021-12-06 DIAGNOSIS — G89.29 CHRONIC RIGHT-SIDED LOW BACK PAIN WITH RIGHT-SIDED SCIATICA: ICD-10-CM

## 2021-12-06 RX ORDER — DICLOFENAC SODIUM 75 MG/1
TABLET, DELAYED RELEASE ORAL
Qty: 60 TABLET | Refills: 1 | Status: SHIPPED | OUTPATIENT
Start: 2021-12-06 | End: 2022-01-25

## 2021-12-06 NOTE — TELEPHONE ENCOUNTER
A refill request was received for:  Requested Prescriptions     Pending Prescriptions Disp Refills   • DICLOFENAC 75 MG Oral Tab EC [Pharmacy Med Name: DICLOFENAC SOD EC 75 MG TAB] 60 tablet 1     Sig: TAKE 1 TABLET (75 MG TOTAL) BY MOUTH 2 (TWO) TIMES KAREN

## 2022-01-25 ENCOUNTER — HOSPITAL ENCOUNTER (OUTPATIENT)
Dept: GENERAL RADIOLOGY | Age: 52
Discharge: HOME | End: 2022-01-25
Attending: FAMILY MEDICINE
Payer: COMMERCIAL

## 2022-01-25 ENCOUNTER — OFFICE VISIT (OUTPATIENT)
Dept: FAMILY MEDICINE CLINIC | Facility: CLINIC | Age: 52
End: 2022-01-25
Payer: COMMERCIAL

## 2022-01-25 VITALS
OXYGEN SATURATION: 99 % | SYSTOLIC BLOOD PRESSURE: 102 MMHG | HEART RATE: 72 BPM | HEIGHT: 62 IN | BODY MASS INDEX: 27.9 KG/M2 | DIASTOLIC BLOOD PRESSURE: 64 MMHG | WEIGHT: 151.63 LBS

## 2022-01-25 DIAGNOSIS — K21.9 GASTROESOPHAGEAL REFLUX DISEASE WITHOUT ESOPHAGITIS: ICD-10-CM

## 2022-01-25 DIAGNOSIS — E55.9 VITAMIN D DEFICIENCY: ICD-10-CM

## 2022-01-25 DIAGNOSIS — M25.552 LEFT HIP PAIN: ICD-10-CM

## 2022-01-25 DIAGNOSIS — Z00.00 ROUTINE MEDICAL EXAM: Primary | ICD-10-CM

## 2022-01-25 DIAGNOSIS — M25.559 HIP PAIN: ICD-10-CM

## 2022-01-25 DIAGNOSIS — Z85.3 HISTORY OF RIGHT BREAST CANCER: ICD-10-CM

## 2022-01-25 DIAGNOSIS — M79.605 LEG PAIN, LATERAL, LEFT: ICD-10-CM

## 2022-01-25 DIAGNOSIS — Z12.11 SCREENING FOR COLON CANCER: ICD-10-CM

## 2022-01-25 PROCEDURE — 3078F DIAST BP <80 MM HG: CPT | Performed by: FAMILY MEDICINE

## 2022-01-25 PROCEDURE — 99213 OFFICE O/P EST LOW 20 MIN: CPT | Performed by: FAMILY MEDICINE

## 2022-01-25 PROCEDURE — 73522 X-RAY EXAM HIPS BI 3-4 VIEWS: CPT | Performed by: FAMILY MEDICINE

## 2022-01-25 PROCEDURE — 99396 PREV VISIT EST AGE 40-64: CPT | Performed by: FAMILY MEDICINE

## 2022-01-25 PROCEDURE — 3074F SYST BP LT 130 MM HG: CPT | Performed by: FAMILY MEDICINE

## 2022-01-25 PROCEDURE — 3008F BODY MASS INDEX DOCD: CPT | Performed by: FAMILY MEDICINE

## 2022-01-25 RX ORDER — DICLOFENAC SODIUM 75 MG/1
TABLET, DELAYED RELEASE ORAL
Qty: 60 TABLET | Refills: 1 | COMMUNITY
Start: 2022-01-25

## 2022-01-27 DIAGNOSIS — M79.605 LEG PAIN, LATERAL, LEFT: ICD-10-CM

## 2022-01-27 DIAGNOSIS — M25.552 LEFT HIP PAIN: Primary | ICD-10-CM

## 2022-01-27 NOTE — PROGRESS NOTES
Nathan Edmonds,    Attached are the results of your recently performed labs/tests: All results are essentially within NORMAL limits. I placed an order for PT. You can start this now or if no improvement in the next few weeks.   Follow up with me

## 2022-02-12 NOTE — ANESTHESIA POSTPROCEDURE EVALUATION
[facility]    Ky Brood Patient Status:  Hospital Outpatient Surgery   Age/Gender 47year old female MRN [id number]   Location [address] [facility] Attending Rian Hu, [address] [facility] [address] Day # 0 PCP Lynn Ramirez MD
None

## 2022-07-11 ENCOUNTER — OFFICE VISIT (OUTPATIENT)
Dept: FAMILY MEDICINE CLINIC | Facility: CLINIC | Age: 52
End: 2022-07-11
Payer: COMMERCIAL

## 2022-07-11 VITALS
OXYGEN SATURATION: 98 % | RESPIRATION RATE: 16 BRPM | DIASTOLIC BLOOD PRESSURE: 70 MMHG | BODY MASS INDEX: 27.9 KG/M2 | HEART RATE: 68 BPM | SYSTOLIC BLOOD PRESSURE: 112 MMHG | WEIGHT: 151.63 LBS | HEIGHT: 62 IN

## 2022-07-11 DIAGNOSIS — M25.552 LEFT HIP PAIN: Primary | ICD-10-CM

## 2022-07-11 DIAGNOSIS — M79.605 LEG PAIN, LATERAL, LEFT: ICD-10-CM

## 2022-07-11 PROCEDURE — 3078F DIAST BP <80 MM HG: CPT | Performed by: NURSE PRACTITIONER

## 2022-07-11 PROCEDURE — 99214 OFFICE O/P EST MOD 30 MIN: CPT | Performed by: NURSE PRACTITIONER

## 2022-07-11 PROCEDURE — 3008F BODY MASS INDEX DOCD: CPT | Performed by: NURSE PRACTITIONER

## 2022-07-11 PROCEDURE — 3074F SYST BP LT 130 MM HG: CPT | Performed by: NURSE PRACTITIONER

## 2022-07-11 RX ORDER — NAPROXEN 500 MG/1
500 TABLET ORAL 2 TIMES DAILY WITH MEALS
Qty: 28 TABLET | Refills: 0 | Status: SHIPPED | OUTPATIENT
Start: 2022-07-11 | End: 2022-07-25

## 2022-11-21 ENCOUNTER — TELEPHONE (OUTPATIENT)
Dept: FAMILY MEDICINE CLINIC | Facility: CLINIC | Age: 52
End: 2022-11-21

## 2022-11-21 NOTE — TELEPHONE ENCOUNTER
Pt has chest pain and tight chest periodically. Encounter routed to triage nurse who was not present today . Advised patient several times to proceed to ER for chest pain asap. Patient declined.

## 2022-11-22 NOTE — TELEPHONE ENCOUNTER
I spoke with pt, c/o CP sensation x 6-7 weeks periodically. Symptoms worse this week. Feels like her breath is taken away, tight under throat and in chest. Pain is subclavicular - Five times a day. Random times, more so at rest. While resting, feels weight on chest - worse with exhaling than inhaling - lasts for seconds then goes away. Has to stop what she is doing. Shallow breathing while having pain. Denies being sick recently. Denies fever, runny/stuffy nose, cough. Dad had COPD and lung Ca. Pt denies asthma or COPD hx. Hx of allergies - denies worsening symptoms at present. Exercises regularly - denies worsening pain with exertion. Has najma tomorrow with onc. I advised pt to keep this appt as scheduled. I strongly advised pt to go to ER or IC if unable to get to ER. Concerns for breathing/lungs, possible heart involvement as well. Advised current symptoms are not normal and needs to be evaluated in person. We do not have in-person availability today in office. Patient verbalizes understanding and agrees with plan. Said she will go later today to be evaluated.

## 2022-12-12 ENCOUNTER — OFFICE VISIT (OUTPATIENT)
Dept: FAMILY MEDICINE CLINIC | Facility: CLINIC | Age: 52
End: 2022-12-12
Payer: COMMERCIAL

## 2022-12-12 VITALS
SYSTOLIC BLOOD PRESSURE: 118 MMHG | HEIGHT: 62 IN | BODY MASS INDEX: 29.08 KG/M2 | WEIGHT: 158 LBS | OXYGEN SATURATION: 99 % | DIASTOLIC BLOOD PRESSURE: 80 MMHG | HEART RATE: 72 BPM

## 2022-12-12 DIAGNOSIS — Z12.11 SCREENING FOR MALIGNANT NEOPLASM OF COLON: ICD-10-CM

## 2022-12-12 DIAGNOSIS — R09.82 POST-NASAL DRIP: ICD-10-CM

## 2022-12-12 DIAGNOSIS — R82.90 MALODOROUS URINE: ICD-10-CM

## 2022-12-12 DIAGNOSIS — R07.89 ATYPICAL CHEST PAIN: Primary | ICD-10-CM

## 2022-12-12 DIAGNOSIS — K21.9 GASTROESOPHAGEAL REFLUX DISEASE, UNSPECIFIED WHETHER ESOPHAGITIS PRESENT: ICD-10-CM

## 2022-12-12 LAB
APPEARANCE: CLEAR
BILIRUBIN: NEGATIVE
GLUCOSE (URINE DIPSTICK): NEGATIVE MG/DL
KETONES (URINE DIPSTICK): NEGATIVE MG/DL
LEUKOCYTES: NEGATIVE
MULTISTIX LOT#: NORMAL NUMERIC
NITRITE, URINE: NEGATIVE
OCCULT BLOOD: NEGATIVE
PH, URINE: 7 (ref 4.5–8)
PROTEIN (URINE DIPSTICK): NEGATIVE MG/DL
SPECIFIC GRAVITY: 1.01 (ref 1–1.03)
URINE-COLOR: YELLOW
UROBILINOGEN,SEMI-QN: 0.2 MG/DL (ref 0–1.9)

## 2022-12-12 RX ORDER — OMEPRAZOLE 20 MG/1
20 TABLET, DELAYED RELEASE ORAL 2 TIMES DAILY
Qty: 60 TABLET | Refills: 0 | Status: SHIPPED | OUTPATIENT
Start: 2022-12-12 | End: 2023-01-11

## 2022-12-12 NOTE — PATIENT INSTRUCTIONS
Flonase nasal spray 1 spray in each nostril twice daily. Think \"nose to toes\" and rinse mouth after use. Generic is OK-over-the-counter. Claritin or Zyrtec once daily.

## 2023-02-03 ENCOUNTER — PATIENT MESSAGE (OUTPATIENT)
Dept: FAMILY MEDICINE CLINIC | Facility: CLINIC | Age: 53
End: 2023-02-03

## 2023-02-03 NOTE — TELEPHONE ENCOUNTER
I don't know that symptoms are GI-related based on what she's describing below. I agree with more urgent evaluation as you recommended. Also, I had ordered a stress test and it doesn't look like she's scheduled that. Should be seen in office sooner due to symptoms and agree with ER precautions as below.

## 2023-02-03 NOTE — TELEPHONE ENCOUNTER
RN called and spoke to patient. Patient c/o chest discomfort that happens multiple times per day, feels like someone is taking her breath away, and that it is tight and a \"squeeze across her upper chest into her throat\" Pain is worse at night time. Patient says that this has been going on for months now and persists every day. Pt is taking omeprazole x2 daily, cannot get into GI until July. Patient would like to know if there is another medication that she can take other than omeprazole. Pt seeing cardio on 2/14/22. Pt says she has SOB sometimes when this happens. Patient works out, states her s/s do not happen when she works out. Patient says that Narayan Truong is a tense person\" and that she always has jaw tightness. Patient says that \"all of her teeth hurt and sensitive since taking the omeprazole. Patient went to dentist yesterday, they did not  find any abnormalities. Pt currently has a \"head cold\"    Patient denies current heart palpitations, shortness of breath, fever, headaches, nausea, vomiting. Or other symptoms. RN instructed patient to proceed to the emergency room for evaluation and assessment for chest pain/ any episodes of chest pain. RN educated patient that heart attack symptoms can present differently in women and that any chest pain/jaw discomfort needs to be evaluated. Patient says that this has been going on for a long time, RN explained that chest pain can be a symptom of heart attack and that ED evaluation is needed for chest pain. Will send to SHELLY Martinez for further recs.

## 2023-02-06 ENCOUNTER — HOSPITAL ENCOUNTER (OUTPATIENT)
Dept: CV DIAGNOSTICS | Facility: HOSPITAL | Age: 53
Discharge: HOME | End: 2023-02-06
Attending: NURSE PRACTITIONER
Payer: COMMERCIAL

## 2023-02-06 DIAGNOSIS — R07.89 ATYPICAL CHEST PAIN: ICD-10-CM

## 2023-02-06 PROCEDURE — 93017 CV STRESS TEST TRACING ONLY: CPT | Performed by: NURSE PRACTITIONER

## 2023-02-06 PROCEDURE — 93018 CV STRESS TEST I&R ONLY: CPT | Performed by: NURSE PRACTITIONER

## 2023-02-10 ENCOUNTER — OFFICE VISIT (OUTPATIENT)
Dept: FAMILY MEDICINE CLINIC | Facility: CLINIC | Age: 53
End: 2023-02-10
Payer: COMMERCIAL

## 2023-02-10 VITALS
SYSTOLIC BLOOD PRESSURE: 108 MMHG | WEIGHT: 162 LBS | HEART RATE: 70 BPM | DIASTOLIC BLOOD PRESSURE: 70 MMHG | BODY MASS INDEX: 30 KG/M2 | OXYGEN SATURATION: 98 %

## 2023-02-10 DIAGNOSIS — R07.89 ATYPICAL CHEST PAIN: ICD-10-CM

## 2023-02-10 DIAGNOSIS — Z12.11 SCREENING FOR MALIGNANT NEOPLASM OF COLON: ICD-10-CM

## 2023-02-10 DIAGNOSIS — K21.9 GASTROESOPHAGEAL REFLUX DISEASE, UNSPECIFIED WHETHER ESOPHAGITIS PRESENT: Primary | ICD-10-CM

## 2023-02-10 PROCEDURE — 3078F DIAST BP <80 MM HG: CPT | Performed by: NURSE PRACTITIONER

## 2023-02-10 PROCEDURE — 3074F SYST BP LT 130 MM HG: CPT | Performed by: NURSE PRACTITIONER

## 2023-02-10 PROCEDURE — 99214 OFFICE O/P EST MOD 30 MIN: CPT | Performed by: NURSE PRACTITIONER

## 2023-02-10 RX ORDER — SUCRALFATE 1 G/1
1 TABLET ORAL
Qty: 56 TABLET | Refills: 0 | Status: SHIPPED | OUTPATIENT
Start: 2023-02-10 | End: 2023-02-24

## 2023-02-10 RX ORDER — PANTOPRAZOLE SODIUM 40 MG/1
40 TABLET, DELAYED RELEASE ORAL
Qty: 90 TABLET | Refills: 0 | Status: SHIPPED | OUTPATIENT
Start: 2023-02-10

## 2023-02-14 ENCOUNTER — LAB ENCOUNTER (OUTPATIENT)
Dept: LAB | Facility: HOSPITAL | Age: 53
End: 2023-02-14
Attending: OBSTETRICS & GYNECOLOGY
Payer: COMMERCIAL

## 2023-02-14 ENCOUNTER — LAB ENCOUNTER (OUTPATIENT)
Dept: LAB | Facility: HOSPITAL | Age: 53
End: 2023-02-14
Attending: INTERNAL MEDICINE
Payer: COMMERCIAL

## 2023-02-14 DIAGNOSIS — R07.89 OTHER CHEST PAIN: Primary | ICD-10-CM

## 2023-02-14 DIAGNOSIS — R12 HEARTBURN: ICD-10-CM

## 2023-02-14 DIAGNOSIS — K21.9 ESOPHAGEAL REFLUX: ICD-10-CM

## 2023-02-14 LAB
ANION GAP SERPL CALC-SCNC: 7 MMOL/L (ref 0–18)
BUN BLD-MCNC: 13 MG/DL (ref 7–18)
BUN/CREAT SERPL: 14.9 (ref 10–20)
CALCIUM BLD-MCNC: 8.9 MG/DL (ref 8.5–10.1)
CHLORIDE SERPL-SCNC: 109 MMOL/L (ref 98–112)
CHOLEST SERPL-MCNC: 131 MG/DL (ref ?–200)
CO2 SERPL-SCNC: 26 MMOL/L (ref 21–32)
CREAT BLD-MCNC: 0.87 MG/DL
FASTING PATIENT LIPID ANSWER: NO
FASTING STATUS PATIENT QL REPORTED: NO
GFR SERPLBLD BASED ON 1.73 SQ M-ARVRAT: 80 ML/MIN/1.73M2 (ref 60–?)
GLUCOSE BLD-MCNC: 106 MG/DL (ref 70–99)
HDLC SERPL-MCNC: 55 MG/DL (ref 40–59)
LDLC SERPL CALC-MCNC: 57 MG/DL (ref ?–100)
MAGNESIUM SERPL-MCNC: 2.3 MG/DL (ref 1.6–2.6)
NONHDLC SERPL-MCNC: 76 MG/DL (ref ?–130)
OSMOLALITY SERPL CALC.SUM OF ELEC: 295 MOSM/KG (ref 275–295)
POTASSIUM SERPL-SCNC: 3.8 MMOL/L (ref 3.5–5.1)
SODIUM SERPL-SCNC: 142 MMOL/L (ref 136–145)
TRIGL SERPL-MCNC: 105 MG/DL (ref 30–149)
TSI SER-ACNC: 4.59 MIU/ML (ref 0.36–3.74)
VLDLC SERPL CALC-MCNC: 15 MG/DL (ref 0–30)

## 2023-02-14 PROCEDURE — 80061 LIPID PANEL: CPT

## 2023-02-14 PROCEDURE — 80048 BASIC METABOLIC PNL TOTAL CA: CPT

## 2023-02-14 PROCEDURE — 36415 COLL VENOUS BLD VENIPUNCTURE: CPT

## 2023-02-14 PROCEDURE — 84443 ASSAY THYROID STIM HORMONE: CPT

## 2023-02-14 PROCEDURE — 83735 ASSAY OF MAGNESIUM: CPT

## 2023-03-06 ENCOUNTER — PATIENT MESSAGE (OUTPATIENT)
Dept: FAMILY MEDICINE CLINIC | Facility: CLINIC | Age: 53
End: 2023-03-06

## 2023-03-06 DIAGNOSIS — E03.8 SUBCLINICAL HYPOTHYROIDISM: Primary | ICD-10-CM

## 2023-03-06 RX ORDER — LEVOTHYROXINE SODIUM 0.03 MG/1
25 TABLET ORAL
Qty: 90 TABLET | Refills: 0 | Status: SHIPPED | OUTPATIENT
Start: 2023-03-06

## 2023-03-06 NOTE — TELEPHONE ENCOUNTER
From: Antione Casas  To: SHELLY Escoto  Sent: 3/6/2023 10:07 AM CST  Subject: Following up    Good morning, Nickie Saenz-    I wanted to [place] back on a couple of things. First I do feel like the Pantoprazole is helping. I am still having the throat and chest sensations. But less severe and less frequently. I have a ECHO this week to finish up the cardiology side of things. I also have a endoscopy and colonoscopy with Martha Butler on 4/14. I was discussing my labs (ordered by Chaim Villarreal) with my daughter Mejia Black who is a nurse. As you will see my TSH is a little elevated. This coupled with some other symptoms that I am having, hair breakage, fatigue, weight gain and constipation. She wanted me to bring these things to your attention to see your thoughts. Let me know if I need an appointment.     Thanks    [employer]

## 2023-04-24 LAB — TSH W/REFLEX TO FT4: 4.45 MIU/L

## 2023-05-02 ENCOUNTER — TELEMEDICINE (OUTPATIENT)
Dept: FAMILY MEDICINE CLINIC | Facility: CLINIC | Age: 53
End: 2023-05-02

## 2023-05-02 DIAGNOSIS — E66.3 OVERWEIGHT (BMI 25.0-29.9): ICD-10-CM

## 2023-05-02 DIAGNOSIS — E03.8 SUBCLINICAL HYPOTHYROIDISM: Primary | ICD-10-CM

## 2023-05-02 DIAGNOSIS — K21.9 GASTROESOPHAGEAL REFLUX DISEASE, UNSPECIFIED WHETHER ESOPHAGITIS PRESENT: ICD-10-CM

## 2023-05-02 PROCEDURE — 99213 OFFICE O/P EST LOW 20 MIN: CPT | Performed by: NURSE PRACTITIONER

## 2023-05-08 DIAGNOSIS — K21.9 GASTROESOPHAGEAL REFLUX DISEASE, UNSPECIFIED WHETHER ESOPHAGITIS PRESENT: ICD-10-CM

## 2023-05-08 RX ORDER — PANTOPRAZOLE SODIUM 40 MG/1
TABLET, DELAYED RELEASE ORAL
Qty: 90 TABLET | Refills: 0 | Status: SHIPPED | OUTPATIENT
Start: 2023-05-08

## 2023-05-08 NOTE — TELEPHONE ENCOUNTER
A refill request was received for:  Requested Prescriptions     Pending Prescriptions Disp Refills    PANTOPRAZOLE 40 MG Oral Tab EC [Pharmacy Med Name: PANTOPRAZOLE SOD DR 40 MG TAB] 90 tablet 0     Sig: TAKE 1 TABLET BY MOUTH EVERY DAY IN THE MORNING BEFORE BREAKFAST     Last refill date:  02/10/2023  Qty: 90  Dx: : Gastroesophageal reflux disease, unspecified whether esophagitis present  Last office visit: 05/02/2023  When is follow up due: 1-2 months     For hormone prescriptions, date of last blood test for rx being requested:    No future appointments.

## 2023-05-27 DIAGNOSIS — E03.8 SUBCLINICAL HYPOTHYROIDISM: ICD-10-CM

## 2023-05-30 RX ORDER — LEVOTHYROXINE SODIUM 0.03 MG/1
TABLET ORAL
Qty: 90 TABLET | Refills: 0 | Status: SHIPPED | OUTPATIENT
Start: 2023-05-30

## 2023-08-02 ENCOUNTER — TELEPHONE (OUTPATIENT)
Dept: FAMILY MEDICINE CLINIC | Facility: CLINIC | Age: 53
End: 2023-08-02

## 2023-08-02 ENCOUNTER — HOSPITAL ENCOUNTER (EMERGENCY)
Age: 53
Discharge: HOME | End: 2023-08-02
Attending: STUDENT IN AN ORGANIZED HEALTH CARE EDUCATION/TRAINING PROGRAM

## 2023-08-02 ENCOUNTER — APPOINTMENT (OUTPATIENT)
Dept: GENERAL RADIOLOGY | Age: 53
End: 2023-08-02
Attending: STUDENT IN AN ORGANIZED HEALTH CARE EDUCATION/TRAINING PROGRAM

## 2023-08-02 VITALS
RESPIRATION RATE: 14 BRPM | OXYGEN SATURATION: 96 % | HEIGHT: 62 IN | HEART RATE: 68 BPM | SYSTOLIC BLOOD PRESSURE: 109 MMHG | TEMPERATURE: 97.6 F | DIASTOLIC BLOOD PRESSURE: 87 MMHG

## 2023-08-02 DIAGNOSIS — R42 LIGHTHEADEDNESS: Primary | ICD-10-CM

## 2023-08-02 DIAGNOSIS — R91.8 PULMONARY MASS: ICD-10-CM

## 2023-08-02 LAB
ALBUMIN SERPL-MCNC: 3.7 G/DL (ref 3.6–5.1)
ALBUMIN/GLOB SERPL: 1 {RATIO} (ref 1–2.4)
ALP SERPL-CCNC: 43 UNITS/L (ref 45–117)
ALT SERPL-CCNC: 26 UNITS/L
ANION GAP SERPL CALC-SCNC: 10 MMOL/L (ref 7–19)
AST SERPL-CCNC: 19 UNITS/L
ATRIAL RATE (BPM): 62
BASOPHILS # BLD: 0 K/MCL (ref 0–0.3)
BASOPHILS NFR BLD: 1 %
BILIRUB SERPL-MCNC: 0.8 MG/DL (ref 0.2–1)
BUN SERPL-MCNC: 9 MG/DL (ref 6–20)
BUN/CREAT SERPL: 17 (ref 7–25)
CALCIUM SERPL-MCNC: 8.7 MG/DL (ref 8.4–10.2)
CHLORIDE SERPL-SCNC: 110 MMOL/L (ref 97–110)
CO2 SERPL-SCNC: 23 MMOL/L (ref 21–32)
CREAT SERPL-MCNC: 0.54 MG/DL (ref 0.51–0.95)
DEPRECATED RDW RBC: 39.1 FL (ref 39–50)
EOSINOPHIL # BLD: 0.1 K/MCL (ref 0–0.5)
EOSINOPHIL NFR BLD: 1 %
ERYTHROCYTE [DISTWIDTH] IN BLOOD: 12.1 % (ref 11–15)
FASTING DURATION TIME PATIENT: ABNORMAL H
GFR SERPLBLD BASED ON 1.73 SQ M-ARVRAT: >90 ML/MIN
GLOBULIN SER-MCNC: 3.7 G/DL (ref 2–4)
GLUCOSE SERPL-MCNC: 107 MG/DL (ref 70–99)
HCT VFR BLD CALC: 39.2 % (ref 36–46.5)
HGB BLD-MCNC: 13.1 G/DL (ref 12–15.5)
IMM GRANULOCYTES # BLD AUTO: 0 K/MCL (ref 0–0.2)
IMM GRANULOCYTES # BLD: 0 %
LYMPHOCYTES # BLD: 1.3 K/MCL (ref 1–4)
LYMPHOCYTES NFR BLD: 22 %
MAGNESIUM SERPL-MCNC: 2 MG/DL (ref 1.7–2.4)
MCH RBC QN AUTO: 29.1 PG (ref 26–34)
MCHC RBC AUTO-ENTMCNC: 33.4 G/DL (ref 32–36.5)
MCV RBC AUTO: 87.1 FL (ref 78–100)
MONOCYTES # BLD: 0.7 K/MCL (ref 0.3–0.9)
MONOCYTES NFR BLD: 12 %
NEUTROPHILS # BLD: 3.6 K/MCL (ref 1.8–7.7)
NEUTROPHILS NFR BLD: 64 %
NRBC BLD MANUAL-RTO: 0 /100 WBC
P AXIS (DEGREES): 54
PLATELET # BLD AUTO: 218 K/MCL (ref 140–450)
POTASSIUM SERPL-SCNC: 3.5 MMOL/L (ref 3.4–5.1)
PR-INTERVAL (MSEC): 134
PROT SERPL-MCNC: 7.4 G/DL (ref 6.4–8.2)
QRS-INTERVAL (MSEC): 86
QT-INTERVAL (MSEC): 448
QTC: 455
R AXIS (DEGREES): 56
RAINBOW EXTRA TUBES HOLD SPECIMEN: NORMAL
RBC # BLD: 4.5 MIL/MCL (ref 4–5.2)
REPORT TEXT: NORMAL
SODIUM SERPL-SCNC: 139 MMOL/L (ref 135–145)
T AXIS (DEGREES): 38
TROPONIN I SERPL DL<=0.01 NG/ML-MCNC: <4 NG/L
VENTRICULAR RATE EKG/MIN (BPM): 62
WBC # BLD: 5.6 K/MCL (ref 4.2–11)

## 2023-08-02 PROCEDURE — 10002807 HB RX 258: Performed by: STUDENT IN AN ORGANIZED HEALTH CARE EDUCATION/TRAINING PROGRAM

## 2023-08-02 PROCEDURE — 96374 THER/PROPH/DIAG INJ IV PUSH: CPT

## 2023-08-02 PROCEDURE — 85025 COMPLETE CBC W/AUTO DIFF WBC: CPT | Performed by: STUDENT IN AN ORGANIZED HEALTH CARE EDUCATION/TRAINING PROGRAM

## 2023-08-02 PROCEDURE — 10002803 HB RX 637: Performed by: STUDENT IN AN ORGANIZED HEALTH CARE EDUCATION/TRAINING PROGRAM

## 2023-08-02 PROCEDURE — 93005 ELECTROCARDIOGRAM TRACING: CPT | Performed by: EMERGENCY MEDICINE

## 2023-08-02 PROCEDURE — 83735 ASSAY OF MAGNESIUM: CPT | Performed by: STUDENT IN AN ORGANIZED HEALTH CARE EDUCATION/TRAINING PROGRAM

## 2023-08-02 PROCEDURE — 71045 X-RAY EXAM CHEST 1 VIEW: CPT

## 2023-08-02 PROCEDURE — 99284 EMERGENCY DEPT VISIT MOD MDM: CPT

## 2023-08-02 PROCEDURE — 10002800 HB RX 250 W HCPCS: Performed by: STUDENT IN AN ORGANIZED HEALTH CARE EDUCATION/TRAINING PROGRAM

## 2023-08-02 PROCEDURE — 96361 HYDRATE IV INFUSION ADD-ON: CPT

## 2023-08-02 PROCEDURE — 84484 ASSAY OF TROPONIN QUANT: CPT | Performed by: STUDENT IN AN ORGANIZED HEALTH CARE EDUCATION/TRAINING PROGRAM

## 2023-08-02 PROCEDURE — 80053 COMPREHEN METABOLIC PANEL: CPT | Performed by: STUDENT IN AN ORGANIZED HEALTH CARE EDUCATION/TRAINING PROGRAM

## 2023-08-02 RX ORDER — LEVOTHYROXINE SODIUM 0.03 MG/1
1 TABLET ORAL DAILY
COMMUNITY
Start: 2023-05-30

## 2023-08-02 RX ORDER — ONDANSETRON 2 MG/ML
4 INJECTION INTRAMUSCULAR; INTRAVENOUS ONCE
Status: DISCONTINUED | OUTPATIENT
Start: 2023-08-02 | End: 2023-08-02

## 2023-08-02 RX ORDER — POTASSIUM CHLORIDE 20 MEQ/1
40 TABLET, EXTENDED RELEASE ORAL ONCE
Status: COMPLETED | OUTPATIENT
Start: 2023-08-02 | End: 2023-08-02

## 2023-08-02 RX ORDER — PANTOPRAZOLE SODIUM 40 MG/1
1 TABLET, DELAYED RELEASE ORAL
COMMUNITY
Start: 2023-02-10

## 2023-08-02 RX ORDER — DIAZEPAM 5 MG/ML
2 INJECTION, SOLUTION INTRAMUSCULAR; INTRAVENOUS ONCE
Status: COMPLETED | OUTPATIENT
Start: 2023-08-02 | End: 2023-08-02

## 2023-08-02 RX ORDER — MECLIZINE HYDROCHLORIDE 25 MG/1
25 TABLET ORAL ONCE
Status: COMPLETED | OUTPATIENT
Start: 2023-08-02 | End: 2023-08-02

## 2023-08-02 RX ADMIN — MECLIZINE HYDROCHLORIDE 25 MG: 25 TABLET ORAL at 13:12

## 2023-08-02 RX ADMIN — SODIUM CHLORIDE 1000 ML: 9 INJECTION, SOLUTION INTRAVENOUS at 10:56

## 2023-08-02 RX ADMIN — DIAZEPAM 2 MG: 5 INJECTION, SOLUTION INTRAMUSCULAR; INTRAVENOUS at 13:12

## 2023-08-02 RX ADMIN — POTASSIUM CHLORIDE 40 MEQ: 1500 TABLET, EXTENDED RELEASE ORAL at 11:39

## 2023-08-02 ASSESSMENT — ENCOUNTER SYMPTOMS
NAUSEA: 0
LIGHT-HEADEDNESS: 1
DIZZINESS: 0
WEAKNESS: 0
WOUND: 0
CHILLS: 0
VOICE CHANGE: 0
FEVER: 0
ABDOMINAL PAIN: 0
CONFUSION: 0
NUMBNESS: 0
VOMITING: 0
SHORTNESS OF BREATH: 0
COUGH: 0
HEADACHES: 0

## 2023-08-02 NOTE — TELEPHONE ENCOUNTER
RN s/w patient. Patient seen in ED today for evaluation of lightheadedness. Had CXR- had finding on it that needs outpatient follow up. Patient scheduled to see [facility] tomorrow for assessment and evaluation of symptoms.

## 2023-08-03 ENCOUNTER — OFFICE VISIT (OUTPATIENT)
Dept: FAMILY MEDICINE CLINIC | Facility: CLINIC | Age: 53
End: 2023-08-03
Payer: COMMERCIAL

## 2023-08-03 ENCOUNTER — TELEPHONE (OUTPATIENT)
Dept: FAMILY MEDICINE CLINIC | Facility: CLINIC | Age: 53
End: 2023-08-03

## 2023-08-03 VITALS
WEIGHT: 159.19 LBS | OXYGEN SATURATION: 98 % | DIASTOLIC BLOOD PRESSURE: 60 MMHG | HEART RATE: 57 BPM | SYSTOLIC BLOOD PRESSURE: 122 MMHG | BODY MASS INDEX: 29.3 KG/M2 | HEIGHT: 62 IN

## 2023-08-03 DIAGNOSIS — R93.89 ABNORMAL CHEST X-RAY: ICD-10-CM

## 2023-08-03 DIAGNOSIS — R42 LIGHTHEADEDNESS: Primary | ICD-10-CM

## 2023-08-03 DIAGNOSIS — B37.31 VULVOVAGINAL CANDIDIASIS: ICD-10-CM

## 2023-08-03 DIAGNOSIS — R42 CHRONIC VERTIGO: ICD-10-CM

## 2023-08-03 PROBLEM — I49.1 PAC (PREMATURE ATRIAL CONTRACTION): Status: RESOLVED | Noted: 2023-02-14 | Resolved: 2023-08-03

## 2023-08-03 PROBLEM — R10.9 ABDOMINAL PAIN: Status: RESOLVED | Noted: 2019-06-17 | Resolved: 2023-08-03

## 2023-08-03 PROBLEM — N61.0 CELLULITIS OF RIGHT BREAST: Status: RESOLVED | Noted: 2020-06-25 | Resolved: 2023-08-03

## 2023-08-03 PROBLEM — R10.9 UNSPECIFIED ABDOMINAL PAIN: Status: RESOLVED | Noted: 2023-02-14 | Resolved: 2023-08-03

## 2023-08-03 PROBLEM — K21.9 GASTROESOPHAGEAL REFLUX DISEASE: Status: RESOLVED | Noted: 2023-02-14 | Resolved: 2023-08-03

## 2023-08-03 PROBLEM — R07.89 ATYPICAL CHEST PAIN: Status: RESOLVED | Noted: 2023-02-14 | Resolved: 2023-08-03

## 2023-08-03 PROBLEM — R79.89 ABNORMAL BILIRUBIN TEST: Status: RESOLVED | Noted: 2017-11-06 | Resolved: 2023-08-03

## 2023-08-03 PROBLEM — L03.90 CELLULITIS: Status: RESOLVED | Noted: 2020-06-25 | Resolved: 2023-08-03

## 2023-08-03 PROCEDURE — 3074F SYST BP LT 130 MM HG: CPT | Performed by: NURSE PRACTITIONER

## 2023-08-03 PROCEDURE — 3078F DIAST BP <80 MM HG: CPT | Performed by: NURSE PRACTITIONER

## 2023-08-03 PROCEDURE — 99214 OFFICE O/P EST MOD 30 MIN: CPT | Performed by: NURSE PRACTITIONER

## 2023-08-03 PROCEDURE — 3008F BODY MASS INDEX DOCD: CPT | Performed by: NURSE PRACTITIONER

## 2023-08-03 RX ORDER — FLUCONAZOLE 150 MG/1
TABLET ORAL
Qty: 2 TABLET | Refills: 0 | Status: SHIPPED | OUTPATIENT
Start: 2023-08-03

## 2023-08-03 NOTE — TELEPHONE ENCOUNTER
Denial was received for patient's CT chest from [facility]. I conducted a [id number] today and got this test approved, approval number [id number] through 9/17/23. Sending as Ramiro Holden. Thanks!

## 2023-08-10 DIAGNOSIS — K21.9 GASTROESOPHAGEAL REFLUX DISEASE, UNSPECIFIED WHETHER ESOPHAGITIS PRESENT: ICD-10-CM

## 2023-08-10 RX ORDER — PANTOPRAZOLE SODIUM 40 MG/1
40 TABLET, DELAYED RELEASE ORAL
Qty: 90 TABLET | Refills: 0 | Status: SHIPPED | OUTPATIENT
Start: 2023-08-10

## 2023-08-10 NOTE — TELEPHONE ENCOUNTER
A refill request was received for:  Requested Prescriptions     Pending Prescriptions Disp Refills    PANTOPRAZOLE 40 MG Oral Tab EC [Pharmacy Med Name: PANTOPRAZOLE SOD DR 40 MG TAB] 90 tablet 0     Sig: TAKE 1 TABLET BY MOUTH EVERY DAY IN THE MORNING BEFORE BREAKFAST     Last refill date:  5/8/23  Qty: 90  Dx:   Last office visit: 8/3/23   When is follow up due: m mos from [address] [place] [address] (11/3/23)     For hormone prescriptions, date of last blood test for rx being requested:    No future appointments. Pt requesting rx refill.

## 2023-08-28 ENCOUNTER — PATIENT MESSAGE (OUTPATIENT)
Dept: FAMILY MEDICINE CLINIC | Facility: CLINIC | Age: 53
End: 2023-08-28

## 2023-08-28 NOTE — TELEPHONE ENCOUNTER
I would recommend her being seen in-person for a visit. They may need a urine test and/or vaginal exam based on her symptoms.

## 2023-08-28 NOTE — TELEPHONE ENCOUNTER
From: Elaine Robertson  To: SHELLY Cisneros  Sent: 8/28/2023 8:42 AM CDT  Subject: Circling back    Good morning Arveyes-    If you recall during my last visit I had complained about yeast infection like symptoms. I did the two Fluconozole (?) doses. Weeks later I am still having some symptoms of mild stinging, frequency and a foul odor. I am traveling for work and can do a televisit if needed. There are several [facility] near me. Or if you need some labs (urine sample), I can provide Wednesday. Please let me know what next steps make sense.     Thanks    [employer]

## 2023-08-28 NOTE — TELEPHONE ENCOUNTER
Pt has stated vaginal odor has not resolved after completion of 8/3/23 fluconazole 150 mg. Does [facility] advising appt? Pt can only schedule video visit. Please advise.

## 2023-08-29 DIAGNOSIS — E03.8 SUBCLINICAL HYPOTHYROIDISM: ICD-10-CM

## 2023-08-29 RX ORDER — LEVOTHYROXINE SODIUM 0.03 MG/1
25 TABLET ORAL
Qty: 90 TABLET | Refills: 0 | Status: SHIPPED | OUTPATIENT
Start: 2023-08-29

## 2023-11-27 DIAGNOSIS — K21.9 GASTROESOPHAGEAL REFLUX DISEASE, UNSPECIFIED WHETHER ESOPHAGITIS PRESENT: ICD-10-CM

## 2023-11-27 DIAGNOSIS — E03.8 SUBCLINICAL HYPOTHYROIDISM: ICD-10-CM

## 2023-11-28 RX ORDER — LEVOTHYROXINE SODIUM 0.03 MG/1
25 TABLET ORAL
Qty: 90 TABLET | Refills: 0 | Status: SHIPPED | OUTPATIENT
Start: 2023-11-28

## 2023-11-28 NOTE — TELEPHONE ENCOUNTER
A refill request was received for:  Requested Prescriptions     Pending Prescriptions Disp Refills    LEVOTHYROXINE 25 MCG Oral Tab [Pharmacy Med Name: LEVOTHYROXINE 25 MCG TABLET] 90 tablet 0     Sig: TAKE 1 TABLET BY MOUTH EVERY DAY BEFORE BREAKFAST     Last refill date:  8/29/23   Qty: 90 and 0   Dx: hypothyroidism   Last office visit:  8/3 /23   When is follow up due: now      For hormone prescriptions, date of last blood test for rx being requested:    No future appointments.

## 2023-12-01 RX ORDER — PANTOPRAZOLE SODIUM 40 MG/1
40 TABLET, DELAYED RELEASE ORAL
Qty: 90 TABLET | Refills: 0 | Status: SHIPPED | OUTPATIENT
Start: 2023-12-01

## 2023-12-01 NOTE — TELEPHONE ENCOUNTER
A refill request was received for:  Requested Prescriptions     Pending Prescriptions Disp Refills    PANTOPRAZOLE 40 MG Oral Tab EC [Pharmacy Med Name: PANTOPRAZOLE SOD DR 40 MG TAB] 90 tablet 0     Sig: TAKE 1 TABLET BY MOUTH BEFORE BREAKFAST     Last refill date:  8/10/23   Qty: 90 and 0   Dx: GERD   Last office visit: 8/3/23

## 2024-01-08 ENCOUNTER — OFFICE VISIT (OUTPATIENT)
Dept: FAMILY MEDICINE CLINIC | Facility: CLINIC | Age: 54
End: 2024-01-08
Payer: COMMERCIAL

## 2024-01-08 VITALS
SYSTOLIC BLOOD PRESSURE: 110 MMHG | BODY MASS INDEX: 29.11 KG/M2 | DIASTOLIC BLOOD PRESSURE: 70 MMHG | WEIGHT: 158.19 LBS | HEIGHT: 62 IN | HEART RATE: 72 BPM | OXYGEN SATURATION: 98 % | TEMPERATURE: 99 F

## 2024-01-08 DIAGNOSIS — M25.60 MORNING STIFFNESS OF JOINTS: ICD-10-CM

## 2024-01-08 DIAGNOSIS — K21.9 GASTROESOPHAGEAL REFLUX DISEASE WITHOUT ESOPHAGITIS: ICD-10-CM

## 2024-01-08 DIAGNOSIS — M79.641 PAIN IN BOTH HANDS: ICD-10-CM

## 2024-01-08 DIAGNOSIS — E03.9 ACQUIRED HYPOTHYROIDISM: ICD-10-CM

## 2024-01-08 DIAGNOSIS — E66.3 OVERWEIGHT (BMI 25.0-29.9): ICD-10-CM

## 2024-01-08 DIAGNOSIS — M79.642 PAIN IN BOTH HANDS: ICD-10-CM

## 2024-01-08 DIAGNOSIS — Z00.00 ADULT GENERAL MEDICAL EXAMINATION: Primary | ICD-10-CM

## 2024-01-08 PROBLEM — R12 CHRONIC HEARTBURN: Status: RESOLVED | Noted: 2019-06-17 | Resolved: 2024-01-08

## 2024-01-08 RX ORDER — SUCRALFATE 1 G/1
1 TABLET ORAL
Qty: 56 TABLET | Refills: 0 | Status: SHIPPED | OUTPATIENT
Start: 2024-01-08 | End: 2024-01-22

## 2024-01-08 RX ORDER — PHENTERMINE HYDROCHLORIDE 37.5 MG/1
37.5 TABLET ORAL
Qty: 90 TABLET | Refills: 0 | Status: SHIPPED | OUTPATIENT
Start: 2024-01-08

## 2024-01-08 RX ORDER — OCTISALATE, AVOBENZONE, HOMOSALATE, AND OCTOCRYLENE 29.4; 29.4; 49; 25.48 MG/ML; MG/ML; MG/ML; MG/ML
1 LOTION TOPICAL DAILY
COMMUNITY
Start: 2023-12-21

## 2024-01-08 RX ORDER — ESTRADIOL 0.1 MG/G
1 CREAM VAGINAL
COMMUNITY
Start: 2023-10-03

## 2024-01-08 NOTE — PROGRESS NOTES
Chief Complaint:   Chief Complaint   Patient presents with    Physical       HPI:   This is a 53 year old female coming in for physical. Feels OK overall. Heartburn has been worse for past 2 months, has mild epigastric discomfort. She takes pantoprazole nightly. Symptoms worse in the morning upon awakening, sometimes worse after eating. Reports decreased appetite, nausea at times. No weight loss. No vomiting. Sees GI, saw their NP recently who prescribed liquid Carafate but insurance did not cover it.    Hx hypothyroidism, due for labs. Reports pain in L 5th finger joints and R 3rd finger joints x months, has some morning stiffness lasting about 45 minutes in these joints as well. Strong family hx of osteoarthritis, no known hx of RA. Would like help losing weight. Exercising more and eating well but no weight loss.     Results for orders placed or performed in visit on 23   TSH W Reflex To Free T4 [E]   Result Value Ref Range    TSH W/REFLEX TO FT4 4.45 mIU/L             Past Medical History:   Diagnosis Date    Atypical chest pain 2023    BRCA1 negative     BRCA2 negative     Breast cancer (HCC) 10/2019    Right breast IDC    Gastroesophageal reflux disease 2023    PAC (premature atrial contraction) 2023    Tinnitus     Unspecified abdominal pain 2023    Visual impairment     glasses/contacts     Past Surgical History:   Procedure Laterality Date    BENIGN BIOPSY LEFT Left 2019    BENIGN BIOPSY LEFT Left 2020    us bx          x3    EXCISE EXCESS SKIN TISSUE,OTHER N/A 12/15/2015    Procedure: LABIAL ADHESIONS TAKE DOWN / LABIALPLASTY;  Surgeon: Sushant Palafox MD;  Location: [facility]    HYSTEROSCOPY,WITH ENDOMETRIAL N/A 12/15/2015    Procedure: HYSTEROSCOPY WITH ENDOMETRIAL ABLATION;  Surgeon: Sushant Palafox MD;  Location: [facility]    LUMPECTOMY RIGHT Right 10/2019    IDC    OTHER SURGICAL HISTORY  10/16/2019    Right breast  lumpectomy, sln bx    RADIATION RIGHT Right 12/2019    TUBAL LIGATION       Social History:  Social History     Socioeconomic History    Marital status:    Tobacco Use    Smoking status: Never    Smokeless tobacco: Never   Vaping Use    Vaping Use: Never used   Substance and Sexual Activity    Alcohol use: Yes     Comment: occasional    Drug use: No     Family History:  Family History   Problem Relation Age of Onset    Cancer Father     Cancer Mother     Breast Cancer Mother 52        age of dx 52    Breast Cancer Paternal Aunt 60        age of dx 60's     Allergies:  No Known Allergies  Current Meds:  Current Outpatient Medications   Medication Sig Dispense Refill    estradiol 0.1 MG/GM Vaginal Cream Place 1 g vaginally twice a week.      Probiotic Product (ALIGN) 4 MG Oral Cap Take 1 capsule by mouth daily.      sucralfate 1 g Oral Tab Take 1 tablet (1 g total) by mouth 4 (four) times daily before meals and nightly for 14 days. 56 tablet 0    Phentermine HCl 37.5 MG Oral Tab Take 1 tablet (37.5 mg total) by mouth every morning before breakfast. 90 tablet 0    pantoprazole 40 MG Oral Tab EC Take 1 tablet (40 mg total) by mouth before breakfast. 90 tablet 0    levothyroxine 25 MCG Oral Tab Take 1 tablet (25 mcg total) by mouth before breakfast. 90 tablet 0    tamoxifen 20 MG Oral Tab Take 1 tablet (20 mg total) by mouth daily. 90 tablet 1      Counseling given: No       REVIEW OF SYSTEMS:   CONSTITUTIONAL:  Denies unusual weight gain/loss, fever, chills, or fatigue.  HEENT:  Eyes:  Denies eye pain, visual loss, blurred vision. Denies hearing loss, sneezing, congestion, runny nose or sore throat.  INTEGUMENTARY:  Denies rashes, itching, skin lesion.  CARDIOVASCULAR:  Denies chest pain, palpitations, edema, dyspnea on exertion or at rest.  RESPIRATORY:  Denies shortness of breath, wheezing, cough or sputum.  GASTROINTESTINAL:  See HPI.  GENITOURINARY: Denies dysuria, hematuria, frequency.  MUSCULOSKELETAL:  See  HPI.  NEUROLOGICAL:  Denies headache, seizures, dizziness.  PSYCHIATRIC:  Denies depression or anxiety. Denies suicidal thoughts.    EXAM:   /70   Pulse 72   Temp 98.6 °F (37 °C)   Ht 5' 2\" (1.575 m)   Wt 158 lb 3.2 oz (71.8 kg)   SpO2 98%   BMI 28.94 kg/m²  Estimated body mass index is 28.94 kg/m² as calculated from the following:    Height as of this encounter: 5' 2\" (1.575 m).    Weight as of this encounter: 158 lb 3.2 oz (71.8 kg).   Vital signs reviewed.Appears stated age, well groomed, in no acute distress.  Physical Exam:  GEN:  Patient is alert, awake and oriented, well developed, well nourished.  HEENT:  Head:  Normocephalic, atraumatic Eyes: EOMI, PERRLA, conjunctivae clear bilaterally, no eye discharge Ears: External normal, TM clear bilaterally. Nose: patent, no nasal discharge. Throat:  No tonsillar erythema or exudate.  Mouth:  No oral lesions, good dentition.  NECK: Supple, no CLAD, no thyromegaly.  SKIN: No rashes, no skin lesion, no bruising, good turgor.  HEART:  Regular rate and rhythm, no murmurs, rubs or gallops.  LUNGS: Clear to auscultation bilterally, no rales/rhonchi/wheezing.  ABDOMEN:  Soft, nondistended, nontender, bowel sounds normal in all 4 quadrants, no masses, no hepatosplenomegaly.  BACK: No tenderness to palpation, FROM.  EXTREMITIES:  No edema, no cyanosis, no clubbing, FROM, 2+ dorsalis pedis pulses bilaterally.  NEURO:  No deficit, normal gait, strength and tone, sensory intact, normal reflexes.    ASSESSMENT AND PLAN:   1. Adult general medical examination  -Healthy diet and regular exercise.  -Annual eye exam and biannual dental visits.  -States she is up to date on pap test, will get us report.  - CBC With Differential With Platelet  - Comp Metabolic Panel (14)  - Hemoglobin A1C  - Lipid Panel  - Assay, Thyroid Stim Hormone    2. Acquired hypothyroidism  -Repeat thyroid labs now.    3. Gastroesophageal reflux disease without esophagitis  -Will prescribe sucralfate  tablets, recommended to dissolve in water and take 30 minutes before each meal and before bedtime x 2 weeks.  -Continue pantoprazole.  -Follow-up with GI.   -GERD lifestyle modifications reviewed with patient.   - sucralfate 1 g Oral Tab; Take 1 tablet (1 g total) by mouth 4 (four) times daily before meals and nightly for 14 days.  Dispense: 56 tablet; Refill: 0    4. Pain in both hands  -Suspect osteoarthritis but will check ARON. Recommended OT, she declines for now. May use Aspercream, Tylenol PRN.   - ARON IFA SCREEN W/REFL TO TITER AND PATTERN, IFA [249] [Q}    5. Morning stiffness of joints  -See above.  - ARON IFA SCREEN W/REFL TO TITER AND PATTERN, IFA [249] [Q}    6. Overweight (BMI 25.0-29.9)  -Will trial phentermine daily. Side effects and risks including cardiovascular risks reviewed with patient. Follow-up 6 months in-person, sooner PRN.  - Phentermine HCl 37.5 MG Oral Tab; Take 1 tablet (37.5 mg total) by mouth every morning before breakfast.  Dispense: 90 tablet; Refill: 0      Meds & Refills for this Visit:  Requested Prescriptions     Signed Prescriptions Disp Refills    sucralfate 1 g Oral Tab 56 tablet 0     Sig: Take 1 tablet (1 g total) by mouth 4 (four) times daily before meals and nightly for 14 days.    Phentermine HCl 37.5 MG Oral Tab 90 tablet 0     Sig: Take 1 tablet (37.5 mg total) by mouth every morning before breakfast.         Problem List:  Patient Active Problem List   Diagnosis    Disorders of bursae and tendons in shoulder region, unspecified    Urinary, incontinence, stress female    Intrinsic aging of facial skin    AK (actinic keratosis)    Excess sun exposure    Breast cancer (HCC)    History of right breast cancer    Gastroesophageal reflux disease without esophagitis    Hypothyroidism       Tami Hdz, APRN  1/8/2024  5:01 PM

## 2024-01-16 ENCOUNTER — PATIENT MESSAGE (OUTPATIENT)
Dept: FAMILY MEDICINE CLINIC | Facility: CLINIC | Age: 54
End: 2024-01-16

## 2024-01-16 NOTE — TELEPHONE ENCOUNTER
Received fax from pharmacy Phentermine needs prior authorization. No pharmacy information on file. S/w Antonette who stated had picked up this Rx and paid out of pocket since she was informed by pharmacy this medication is not covered under her insurance.

## 2024-02-29 DIAGNOSIS — E03.8 SUBCLINICAL HYPOTHYROIDISM: ICD-10-CM

## 2024-02-29 DIAGNOSIS — K21.9 GASTROESOPHAGEAL REFLUX DISEASE, UNSPECIFIED WHETHER ESOPHAGITIS PRESENT: ICD-10-CM

## 2024-02-29 RX ORDER — PANTOPRAZOLE SODIUM 40 MG/1
40 TABLET, DELAYED RELEASE ORAL
Qty: 90 TABLET | Refills: 0 | Status: SHIPPED | OUTPATIENT
Start: 2024-02-29

## 2024-02-29 RX ORDER — LEVOTHYROXINE SODIUM 0.03 MG/1
25 TABLET ORAL
Qty: 90 TABLET | Refills: 0 | Status: SHIPPED | OUTPATIENT
Start: 2024-02-29

## 2024-03-02 ENCOUNTER — HOSPITAL ENCOUNTER (OUTPATIENT)
Dept: GENERAL RADIOLOGY | Age: 54
Discharge: HOME | End: 2024-03-02
Attending: NURSE PRACTITIONER
Payer: COMMERCIAL

## 2024-03-02 ENCOUNTER — OFFICE VISIT (OUTPATIENT)
Dept: FAMILY MEDICINE CLINIC | Facility: CLINIC | Age: 54
End: 2024-03-02
Payer: COMMERCIAL

## 2024-03-02 VITALS
OXYGEN SATURATION: 97 % | BODY MASS INDEX: 28.49 KG/M2 | HEIGHT: 62 IN | DIASTOLIC BLOOD PRESSURE: 78 MMHG | WEIGHT: 154.81 LBS | HEART RATE: 82 BPM | SYSTOLIC BLOOD PRESSURE: 118 MMHG | TEMPERATURE: 97 F

## 2024-03-02 DIAGNOSIS — R05.2 SUBACUTE COUGH: Primary | ICD-10-CM

## 2024-03-02 DIAGNOSIS — R06.09 DYSPNEA ON EXERTION: ICD-10-CM

## 2024-03-02 DIAGNOSIS — R07.89 CHEST TIGHTNESS: ICD-10-CM

## 2024-03-02 DIAGNOSIS — R05.2 SUBACUTE COUGH: ICD-10-CM

## 2024-03-02 PROCEDURE — 71046 X-RAY EXAM CHEST 2 VIEWS: CPT | Performed by: NURSE PRACTITIONER

## 2024-03-02 PROCEDURE — 99213 OFFICE O/P EST LOW 20 MIN: CPT | Performed by: NURSE PRACTITIONER

## 2024-03-02 RX ORDER — METHYLPREDNISOLONE 4 MG/1
TABLET ORAL
Qty: 1 EACH | Refills: 0 | Status: SHIPPED | OUTPATIENT
Start: 2024-03-02

## 2024-03-02 RX ORDER — ALBUTEROL SULFATE 90 UG/1
2 AEROSOL, METERED RESPIRATORY (INHALATION) EVERY 6 HOURS PRN
Qty: 1 EACH | Refills: 0 | Status: SHIPPED | OUTPATIENT
Start: 2024-03-02

## 2024-03-02 NOTE — PROGRESS NOTES
Chief Complaint:   Chief Complaint   Patient presents with    Cough     Patient presents today with a persistent cough from a cold that started back in February.         HPI:   This is a 54 year old female coming in for cough that has persisted x 1 month. Patient was ill in early February with chills, congestion, etc. Most of those symptoms have resolved but cough persists. Now cough is dry, has some chest tightness with cough. Tried to walk with a friend this week but had some shortness of breath with exertion. Has not checked temp. Taking Flonase. +Headaches, having some neck/back pain.     Results for orders placed or performed in visit on 23   TSH W Reflex To Free T4 [E]   Result Value Ref Range    TSH W/REFLEX TO FT4 4.45 mIU/L             Past Medical History:   Diagnosis Date    Atypical chest pain 2023    BRCA1 negative     BRCA2 negative     Breast cancer (HCC) 10/2019    Right breast IDC    Gastroesophageal reflux disease 2023    PAC (premature atrial contraction) 2023    Tinnitus     Unspecified abdominal pain 2023    Visual impairment     glasses/contacts     Past Surgical History:   Procedure Laterality Date    BENIGN BIOPSY LEFT Left 2019    BENIGN BIOPSY LEFT Left 2020    us bx          x3    EXCISE EXCESS SKIN TISSUE,OTHER N/A 12/15/2015    Procedure: LABIAL ADHESIONS TAKE DOWN / LABIALPLASTY;  Surgeon: Sushant Palafox MD;  Location: [facility]    HYSTEROSCOPY,WITH ENDOMETRIAL N/A 12/15/2015    Procedure: HYSTEROSCOPY WITH ENDOMETRIAL ABLATION;  Surgeon: Sushant Palafox MD;  Location: [facility]    LUMPECTOMY RIGHT Right 10/2019    IDC    OTHER SURGICAL HISTORY  10/16/2019    Right breast lumpectomy, sln bx    RADIATION RIGHT Right 2019    TUBAL LIGATION       Social History:  Social History     Socioeconomic History    Marital status:    Tobacco Use    Smoking status: Never    Smokeless tobacco: Never   Vaping  Use    Vaping Use: Never used   Substance and Sexual Activity    Alcohol use: Yes     Comment: occasional    Drug use: No     Family History:  Family History   Problem Relation Age of Onset    Cancer Father     Cancer Mother     Breast Cancer Mother 52        age of dx 52    Breast Cancer Paternal Aunt 60        age of dx 60's     Allergies:  No Known Allergies  Current Meds:  Current Outpatient Medications   Medication Sig Dispense Refill    methylPREDNISolone (MEDROL) 4 MG Oral Tablet Therapy Pack As directed. 1 each 0    albuterol 108 (90 Base) MCG/ACT Inhalation Aero Soln Inhale 2 puffs into the lungs every 6 (six) hours as needed for Wheezing. 1 each 0    levothyroxine 25 MCG Oral Tab Take 1 tablet (25 mcg total) by mouth before breakfast. 90 tablet 0    pantoprazole 40 MG Oral Tab EC Take 1 tablet (40 mg total) by mouth before breakfast. 90 tablet 0    estradiol 0.1 MG/GM Vaginal Cream Place 1 g vaginally twice a week.      Phentermine HCl 37.5 MG Oral Tab Take 1 tablet (37.5 mg total) by mouth every morning before breakfast. 90 tablet 0    tamoxifen 20 MG Oral Tab Take 1 tablet (20 mg total) by mouth daily. 90 tablet 1      Counseling given: Not Answered       REVIEW OF SYSTEMS:   CONSTITUTIONAL:  Denies unusual weight gain/loss, fever, chills. +Fatigue.  HEENT:  Eyes:  Denies eye pain, visual loss, blurred vision. Denies hearing loss, sneezing, congestion, runny nose or sore throat.  INTEGUMENTARY:  Denies rashes, itching, skin lesion.  CARDIOVASCULAR:  Denies palpitations, edema. Has had some chest tightness with coughing.  RESPIRATORY:  See HPI.  GASTROINTESTINAL:  Denies abdominal pain, nausea, vomiting, constipation, diarrhea, or blood in stool.  MUSCULOSKELETAL:  Pain in neck/back.  NEUROLOGICAL:  Denies headache, seizures, dizziness.    EXAM:   /78 (BP Location: Right arm, Patient Position: Sitting, Cuff Size: adult)   Pulse 82   Temp 97.1 °F (36.2 °C) (Temporal)   Ht 5' 2\" (1.575 m)   Wt 154  lb 12.8 oz (70.2 kg)   SpO2 97%   BMI 28.31 kg/m²  Estimated body mass index is 28.31 kg/m² as calculated from the following:    Height as of this encounter: 5' 2\" (1.575 m).    Weight as of this encounter: 154 lb 12.8 oz (70.2 kg).   Vital signs reviewed.Appears stated age, well groomed, in no acute distress.  Physical Exam:  GEN:  Patient is alert, awake and oriented, well developed, well nourished.  HEENT:  Head:  Normocephalic, atraumatic Eyes: EOMI, PERRLA, conjunctivae clear bilaterally, no eye discharge Ears: External normal, TM clear bilaterally. Nose: patent, no nasal discharge. Throat:  No tonsillar erythema or exudate.  Mouth:  No oral lesions, good dentition.  NECK: Supple, no CLAD, no thyromegaly.  SKIN: No rashes, no skin lesion, no bruising, good turgor.  HEART:  Regular rate and rhythm, no murmurs, rubs or gallops.  LUNGS: Clear to auscultation bilterally, no rales/rhonchi/wheezing.  EXTREMITIES:  No edema.  NEURO:  No deficit, normal gait, strength and tone, sensory intact.    ASSESSMENT AND PLAN:   1. Subacute cough  -Will check CXR today. Lungs clear on exam.  -If CXR negative can start Medrol DosePak. Discussed how to take this medication and possible side effects.  -May use albuterol inhaler Q6H PRN for chest tightness/dyspnea.   -Offered benzonatate, she will use OTC cough medications.  -Push fluids, humidifier, rest.   -See me 1-2 weeks PRN if symptoms persist or worsen.  - XR CHEST PA + LAT CHEST (CPT=71046); Future  - methylPREDNISolone (MEDROL) 4 MG Oral Tablet Therapy Pack; As directed.  Dispense: 1 each; Refill: 0  - albuterol 108 (90 Base) MCG/ACT Inhalation Aero Soln; Inhale 2 puffs into the lungs every 6 (six) hours as needed for Wheezing.  Dispense: 1 each; Refill: 0    2. Chest tightness  -See above.  -To ER with worsening or persistent chest tightness/pain. See me 1-2 weeks if this persists.   - XR CHEST PA + LAT CHEST (CPT=71046); Future  - methylPREDNISolone (MEDROL) 4 MG Oral  Tablet Therapy Pack; As directed.  Dispense: 1 each; Refill: 0  - albuterol 108 (90 Base) MCG/ACT Inhalation Aero Soln; Inhale 2 puffs into the lungs every 6 (six) hours as needed for Wheezing.  Dispense: 1 each; Refill: 0    3. Dyspnea on exertion  -See above. To ER with worsening symptoms.  - XR CHEST PA + LAT CHEST (CPT=71046); Future  - methylPREDNISolone (MEDROL) 4 MG Oral Tablet Therapy Pack; As directed.  Dispense: 1 each; Refill: 0  - albuterol 108 (90 Base) MCG/ACT Inhalation Aero Soln; Inhale 2 puffs into the lungs every 6 (six) hours as needed for Wheezing.  Dispense: 1 each; Refill: 0      Meds & Refills for this Visit:  Requested Prescriptions     Signed Prescriptions Disp Refills    methylPREDNISolone (MEDROL) 4 MG Oral Tablet Therapy Pack 1 each 0     Sig: As directed.    albuterol 108 (90 Base) MCG/ACT Inhalation Aero Soln 1 each 0     Sig: Inhale 2 puffs into the lungs every 6 (six) hours as needed for Wheezing.         Problem List:  Patient Active Problem List   Diagnosis    Disorders of bursae and tendons in shoulder region, unspecified    Urinary, incontinence, stress female    Intrinsic aging of facial skin    AK (actinic keratosis)    Excess sun exposure    Breast cancer (HCC)    History of right breast cancer    Gastroesophageal reflux disease without esophagitis    Hypothyroidism       Tami Hdz, APRN  3/2/2024  8:19 AM

## 2024-04-04 DIAGNOSIS — E66.3 OVERWEIGHT (BMI 25.0-29.9): ICD-10-CM

## 2024-04-04 RX ORDER — PHENTERMINE HYDROCHLORIDE 37.5 MG/1
37.5 TABLET ORAL
Qty: 90 TABLET | Refills: 0 | Status: SHIPPED | OUTPATIENT
Start: 2024-04-04

## 2024-04-04 NOTE — TELEPHONE ENCOUNTER
A refill request was received for:  Requested Prescriptions     Pending Prescriptions Disp Refills    Phentermine HCl 37.5 MG Oral Tab 90 tablet 0     Sig: Take 1 tablet (37.5 mg total) by mouth every morning before breakfast.     Last refill date:  01/08/2024  Qty: 90  Dx: Wt loss   Last office visit: 03/02/2024   When is follow up due: 3 months      For hormone prescriptions, date of last blood test for rx being requested:    No future appointments.

## 2024-05-28 DIAGNOSIS — K21.9 GASTROESOPHAGEAL REFLUX DISEASE, UNSPECIFIED WHETHER ESOPHAGITIS PRESENT: ICD-10-CM

## 2024-05-28 DIAGNOSIS — E03.8 SUBCLINICAL HYPOTHYROIDISM: ICD-10-CM

## 2024-05-28 RX ORDER — PANTOPRAZOLE SODIUM 40 MG/1
40 TABLET, DELAYED RELEASE ORAL
Qty: 90 TABLET | Refills: 0 | Status: SHIPPED | OUTPATIENT
Start: 2024-05-28

## 2024-05-28 RX ORDER — LEVOTHYROXINE SODIUM 0.03 MG/1
25 TABLET ORAL
Qty: 90 TABLET | Refills: 0 | Status: SHIPPED | OUTPATIENT
Start: 2024-05-28

## 2024-05-28 NOTE — TELEPHONE ENCOUNTER
A refill request was received for:  Requested Prescriptions     Pending Prescriptions Disp Refills    PANTOPRAZOLE 40 MG Oral Tab EC [Pharmacy Med Name: PANTOPRAZOLE SOD DR 40 MG TAB] 90 tablet 0     Sig: TAKE 1 TABLET BY MOUTH BEFORE BREAKFAST    LEVOTHYROXINE 25 MCG Oral Tab [Pharmacy Med Name: LEVOTHYROXINE 25 MCG TABLET] 90 tablet 0     Sig: TAKE 1 TABLET BY MOUTH BEFORE BREAKFAST.     Last refill date:  2/29/24   Qty: 90 and 0   Dx: hypothyroidism, GERD  Last office visit: 3/2/24   When is follow up due:  4/8/24

## 2024-07-05 ENCOUNTER — TELEPHONE (OUTPATIENT)
Dept: MAMMOGRAPHY | Facility: HOSPITAL | Age: 54
End: 2024-07-05

## 2024-07-05 NOTE — TELEPHONE ENCOUNTER
Spoke with Sarah Edmonds regarding Turner Lymph Node mapping to be done in nuclear medicine department before lumpectomy surgery scheduled for 8-08-24 with Dr Martinez. Also reviewed wire localization to be done in [facility]. Both procedures explained and all questions answered. Pt to be transported to each department by  through [facility]. Will make every effort to ensure privacy and safety when transported between departments. Breast Care Coordinator to provide education and written information regarding breast surgery, breast cancer and lymphedema. Pt verbalized understanding and had no further questions at this time.

## 2024-07-11 ENCOUNTER — OFFICE VISIT (OUTPATIENT)
Dept: FAMILY MEDICINE CLINIC | Facility: CLINIC | Age: 54
End: 2024-07-11
Payer: COMMERCIAL

## 2024-07-11 ENCOUNTER — TELEPHONE (OUTPATIENT)
Dept: FAMILY MEDICINE CLINIC | Facility: CLINIC | Age: 54
End: 2024-07-11

## 2024-07-11 VITALS
TEMPERATURE: 99 F | DIASTOLIC BLOOD PRESSURE: 78 MMHG | SYSTOLIC BLOOD PRESSURE: 102 MMHG | HEIGHT: 62 IN | BODY MASS INDEX: 26.13 KG/M2 | HEART RATE: 91 BPM | OXYGEN SATURATION: 97 % | WEIGHT: 142 LBS

## 2024-07-11 DIAGNOSIS — E66.3 OVERWEIGHT (BMI 25.0-29.9): ICD-10-CM

## 2024-07-11 DIAGNOSIS — C50.912 INFILTRATING DUCTAL CARCINOMA OF LEFT BREAST (HCC): ICD-10-CM

## 2024-07-11 DIAGNOSIS — H60.331 ACUTE SWIMMER'S EAR OF RIGHT SIDE: ICD-10-CM

## 2024-07-11 DIAGNOSIS — Z01.818 PRE-OP EXAM: Primary | ICD-10-CM

## 2024-07-11 LAB
ATRIAL RATE: 75 BPM
P AXIS: 74 DEGREES
P-R INTERVAL: 132 MS
Q-T INTERVAL: 396 MS
QRS DURATION: 72 MS
QTC CALCULATION (BEZET): 442 MS
R AXIS: 60 DEGREES
T AXIS: 26 DEGREES
VENTRICULAR RATE: 75 BPM

## 2024-07-11 PROCEDURE — 99214 OFFICE O/P EST MOD 30 MIN: CPT | Performed by: NURSE PRACTITIONER

## 2024-07-11 PROCEDURE — 93000 ELECTROCARDIOGRAM COMPLETE: CPT | Performed by: NURSE PRACTITIONER

## 2024-07-11 RX ORDER — CIPROFLOXACIN AND DEXAMETHASONE 3; 1 MG/ML; MG/ML
4 SUSPENSION/ DROPS AURICULAR (OTIC) 2 TIMES DAILY
Qty: 7.5 ML | Refills: 0 | Status: SHIPPED | OUTPATIENT
Start: 2024-07-11

## 2024-07-11 RX ORDER — PHENTERMINE HYDROCHLORIDE 37.5 MG/1
37.5 TABLET ORAL
Qty: 90 TABLET | Refills: 0 | Status: SHIPPED | OUTPATIENT
Start: 2024-07-11

## 2024-07-11 RX ORDER — LEVOTHYROXINE SODIUM ANHYDROUS 100 UG/5ML
10 INJECTION, POWDER, LYOPHILIZED, FOR SOLUTION INTRAVENOUS DAILY
COMMUNITY
End: 2024-07-11

## 2024-07-11 NOTE — TELEPHONE ENCOUNTER
Please fax my H&P, labs, and EKG from 7/11/24 to Dr. Martinez's office at [phone]. Surgery date 8/8/24. Thanks!

## 2024-07-11 NOTE — H&P
HPI:     Chief Complaint   Patient presents with    Pre-Op Exam       Sarah Edmonds is a 54 year old female who presents for a pre-operative physical exam.  Patient is to have left breast needle localization lumpectomy with sentinel lymph node biopsy, to be done by Dr. Fabian Martinez at [facility] on 8/8/2024 for breast cancer.     Patient denies chest pain, dyspnea, headaches, dizziness, syncope or near-syncope, easy fatigability, palpitations. Can climb 1-2 flights of stairs without difficulty.    Pt has had previous anesthesia:  Yes.  Previous complications:  No  Hx of DVT/PE: No    Patient has additional complaints of:  Pain of tragus of R ear along with some discharge on the pillow in the morning, present x few days.  Hx of external ear infection and this feels similar to her. Has mild nasal congestion as well, takes Flonase and Zyrtec.  Requests refill of phentermine.     See Past Medical and Surgical History and ROS for other pertinent complaints and concerns      Current Outpatient Medications   Medication Sig Dispense Refill    Phentermine HCl 37.5 MG Oral Tab Take 1 tablet (37.5 mg total) by mouth every morning before breakfast. 90 tablet 0    ciprofloxacin-dexamethasone 0.3-0.1 % Otic Suspension Place 4 drops into the right ear 2 (two) times daily. 7.5 mL 0    PANTOPRAZOLE 40 MG Oral Tab EC TAKE 1 TABLET BY MOUTH BEFORE BREAKFAST 90 tablet 0    LEVOTHYROXINE 25 MCG Oral Tab TAKE 1 TABLET BY MOUTH BEFORE BREAKFAST. 90 tablet 0    tamoxifen 20 MG Oral Tab Take 1 tablet (20 mg total) by mouth daily. 90 tablet 1      Allergies: No Known Allergies   Past Medical History:    Atypical chest pain    BRCA1 negative    BRCA2 negative    Breast cancer (HCC)    Right breast IDC    Gastroesophageal reflux disease    PAC (premature atrial contraction)    Tinnitus    Unspecified abdominal pain    Visual impairment    glasses/contacts      Past Surgical History:   Procedure Laterality Date    Benign biopsy left  Left 2019    Benign biopsy left Left 2020    us bx          x3    Excise excess skin tissue,other N/A 12/15/2015    Procedure: LABIAL ADHESIONS TAKE DOWN / LABIALPLASTY;  Surgeon: Sushant Palafox MD;  Location[facility]    Hysteroscopy,with endometrial N/A 12/15/2015    Procedure: HYSTEROSCOPY WITH ENDOMETRIAL ABLATION;  Surgeon: Sushant Palafox MD;  Location: [facility]    Lumpectomy right Right 10/2019    IDC    Other surgical history  10/16/2019    Right breast lumpectomy, sln bx    Radiation right Right 2019    Tubal ligation        Family History   Problem Relation Age of Onset    Cancer Father     Cancer Mother     Breast Cancer Mother 52        age of dx 52    Breast Cancer Paternal Aunt 60        age of dx 60's      Social History:   Social History     Socioeconomic History    Marital status:    Tobacco Use    Smoking status: Never    Smokeless tobacco: Never   Vaping Use    Vaping status: Never Used   Substance and Sexual Activity    Alcohol use: Yes     Comment: occasional    Drug use: No         REVIEW OF SYSTEMS:   Pertinent positives and negatives noted in the the HPI. Specifically:  GEN:  No fever or fatigue  HEAD:  No headaches, no dizziness  EYES:  No vision change or complaints  EARS:  No hearing loss. R ear pain as per HPI.  MOUTH/THROAT:  No sore throat or dental problems, no oral lesions  HEART:  No chest pain or palpitations  LUNG:  No SOB, cough or wheeze  GI:  No abdominal pain.  No N/V/D/C  :  No dysuria  MS:  No joint pain or swelling B  NEURO:  Denies numbness or tingling or weakness  PSYCH:  No mood concerns, denies SHIP  SKIN: no rash, no skin lesions or changing moles    EXAM:   /78   Pulse 91   Temp 98.6 °F (37 °C)   Ht 5' 2\" (1.575 m)   Wt 142 lb (64.4 kg)   SpO2 97%   BMI 25.97 kg/m²  Body mass index is 25.97 kg/m².  GENERAL: well developed, well nourished, female  in no apparent distress  SKIN: no suspicious  lesions,  skin color wnl  HEENT: atraumatic, normocephalic, nose and throat are clear;dentition good, EARS: R EAC mildly swollen and red, no significant discharge seen. TM intact/clear BL.  EYES:PERRLA, EOMI,conjunctiva are clear, no discharge  NECK: supple, no lymphadenopathy, no TM  LUNGS: good BS B, clear to auscultation B, no wheezing and no rhonchi B   CARDIO: RRR without murmur, NL S1 S2, no S3 S4  EXT: no edema, radial and DP pulses WNL B UE/LE  GI: NABS, soft, nodistended,no masses, no HSM or tenderness  MS: grossly NL B UE/LE, no significant joint deformities, FROM B UE/LE  PSYCH: mood and affect WNL, speech clear, mood and thought congruent  NEURO: A&O x 3, CNII-XII intact B, motor and sensory grossly intact B UE/LE, DTR's 2/4 B LE, gait normal    ASSESSMENT AND PLAN:   Sarah Edmonds is a 54 year old female who presents for a Pre-Operative Physical Exam.     Patient will have left breast needle localization lumpectomy with sentinel lymph node biopsy with Dr. Fabian Martinez on 8/8/2024 at [facility] for breast cancer.    Patient is in good health and has no significant history of cardiac or pulmonary conditions and is therefore at low risk for cardiac and pulmonary complications from the above surgery.    Will check:  CBC, CMP, PT/PTT  EKG in office shows normal sinus rhythm rate 75 bpm, normal EKG. No prior available for comparison.     Patient is an acceptable surgical candidate and is medically cleared for surgery as long as the above tests are within normal limits.    Send/FAX results to: [phone]    Additional Assessment and Plan:  1. Pre-op exam  -As above.  - CBC With Differential With Platelet  - Comp Metabolic Panel (14)  - Prothrombin Time (PT) [E]  - PTT, Activated [E]  - ELECTROCARDIOGRAM, COMPLETE    2. Infiltrating ductal carcinoma of left breast (HCC)  -As above.    3. Acute swimmer's ear of right side  -Will treat with Ciprodex. Discussed how to use this medicine and possible  side effects. To avoid getting ear wet until fully healed.   - ciprofloxacin-dexamethasone 0.3-0.1 % Otic Suspension; Place 4 drops into the right ear 2 (two) times daily.  Dispense: 7.5 mL; Refill: 0    4. Overweight (BMI 25.0-29.9)  -Will refill phentermine. Hold day of surgery.  - Phentermine HCl 37.5 MG Oral Tab; Take 1 tablet (37.5 mg total) by mouth every morning before breakfast.  Dispense: 90 tablet; Refill: 0      The patient indicates understanding of the above recommendations and agrees to the above plan.  Follow up: as above    Orders Placed This Encounter   Procedures    CBC With Differential With Platelet    Comp Metabolic Panel (14)    Prothrombin Time (PT) [E]    PTT, Activated [E]       Meds & Refills for this Visit:  Requested Prescriptions     Signed Prescriptions Disp Refills    Phentermine HCl 37.5 MG Oral Tab 90 tablet 0     Sig: Take 1 tablet (37.5 mg total) by mouth every morning before breakfast.    ciprofloxacin-dexamethasone 0.3-0.1 % Otic Suspension 7.5 mL 0     Sig: Place 4 drops into the right ear 2 (two) times daily.       Imaging & Consults:  ELECTROCARDIOGRAM, COMPLETE    SHELLY Jones

## 2024-07-19 LAB
ABSOLUTE BASOPHILS: 30 CELLS/UL (ref 0–200)
ABSOLUTE EOSINOPHILS: 80 CELLS/UL (ref 15–500)
ABSOLUTE LYMPHOCYTES: 1338 CELLS/UL (ref 850–3900)
ABSOLUTE MONOCYTES: 407 CELLS/UL (ref 200–950)
ABSOLUTE NEUTROPHILS: 1946 CELLS/UL (ref 1500–7800)
ALBUMIN/GLOBULIN RATIO: 1.6 (CALC) (ref 1–2.5)
ALBUMIN: 4.1 G/DL (ref 3.6–5.1)
ALKALINE PHOSPHATASE: 44 U/L (ref 37–153)
ALT: 15 U/L (ref 6–29)
AST: 19 U/L (ref 10–35)
BASOPHILS: 0.8 %
BILIRUBIN, TOTAL: 0.6 MG/DL (ref 0.2–1.2)
BUN: 8 MG/DL (ref 7–25)
CALCIUM: 9.3 MG/DL (ref 8.6–10.4)
CARBON DIOXIDE: 27 MMOL/L (ref 20–32)
CHLORIDE: 108 MMOL/L (ref 98–110)
CREATININE: 0.6 MG/DL (ref 0.5–1.03)
EGFR: 106 ML/MIN/1.73M2
EOSINOPHILS: 2.1 %
GLOBULIN: 2.6 G/DL (CALC) (ref 1.9–3.7)
GLUCOSE: 91 MG/DL (ref 65–99)
HEMATOCRIT: 39.3 % (ref 35–45)
HEMOGLOBIN: 12.1 G/DL (ref 11.7–15.5)
INR: 1
LYMPHOCYTES: 35.2 %
MCH: 27.8 PG (ref 27–33)
MCHC: 30.8 G/DL (ref 32–36)
MCV: 90.3 FL (ref 80–100)
MONOCYTES: 10.7 %
MPV: 10.8 FL (ref 7.5–12.5)
NEUTROPHILS: 51.2 %
PARTIAL THROMBOPLASTIN$TIME, ACTIVATED: 28 SEC (ref 23–32)
PLATELET COUNT: 242 THOUSAND/UL (ref 140–400)
POTASSIUM: 4.8 MMOL/L (ref 3.5–5.3)
PROTEIN, TOTAL: 6.7 G/DL (ref 6.1–8.1)
PT: 10.5 SEC (ref 9–11.5)
RDW: 12.1 % (ref 11–15)
RED BLOOD CELL COUNT: 4.35 MILLION/UL (ref 3.8–5.1)
SODIUM: 142 MMOL/L (ref 135–146)
WHITE BLOOD CELL COUNT: 3.8 THOUSAND/UL (ref 3.8–10.8)

## 2024-07-20 DIAGNOSIS — R76.8 POSITIVE ANA (ANTINUCLEAR ANTIBODY): Primary | ICD-10-CM

## 2024-07-20 LAB
ABSOLUTE BASOPHILS: 20 CELLS/UL (ref 0–200)
ABSOLUTE EOSINOPHILS: 78 CELLS/UL (ref 15–500)
ABSOLUTE LYMPHOCYTES: 1373 CELLS/UL (ref 850–3900)
ABSOLUTE MONOCYTES: 456 CELLS/UL (ref 200–950)
ABSOLUTE NEUTROPHILS: 1973 CELLS/UL (ref 1500–7800)
ALBUMIN/GLOBULIN RATIO: 1.7 (CALC) (ref 1–2.5)
ALBUMIN: 4.2 G/DL (ref 3.6–5.1)
ALKALINE PHOSPHATASE: 41 U/L (ref 37–153)
ALT: 15 U/L (ref 6–29)
ANA SCREEN, IFA: POSITIVE
AST: 19 U/L (ref 10–35)
BASOPHILS: 0.5 %
BILIRUBIN, TOTAL: 0.6 MG/DL (ref 0.2–1.2)
BUN: 8 MG/DL (ref 7–25)
CALCIUM: 9.3 MG/DL (ref 8.6–10.4)
CARBON DIOXIDE: 27 MMOL/L (ref 20–32)
CHLORIDE: 108 MMOL/L (ref 98–110)
CHOL/HDLC RATIO: 2.8 (CALC)
CHOLESTEROL, TOTAL: 142 MG/DL
CREATININE: 0.58 MG/DL (ref 0.5–1.03)
EGFR: 107 ML/MIN/1.73M2
EOSINOPHILS: 2 %
GLOBULIN: 2.5 G/DL (CALC) (ref 1.9–3.7)
GLUCOSE: 91 MG/DL (ref 65–99)
HDL CHOLESTEROL: 51 MG/DL
HEMATOCRIT: 39.2 % (ref 35–45)
HEMOGLOBIN A1C: 5.7 % OF TOTAL HGB
HEMOGLOBIN: 12.3 G/DL (ref 11.7–15.5)
LDL-CHOLESTEROL: 74 MG/DL (CALC)
LYMPHOCYTES: 35.2 %
MCH: 28.1 PG (ref 27–33)
MCHC: 31.4 G/DL (ref 32–36)
MCV: 89.7 FL (ref 80–100)
MONOCYTES: 11.7 %
MPV: 10.7 FL (ref 7.5–12.5)
NEUTROPHILS: 50.6 %
NON-HDL CHOLESTEROL: 91 MG/DL (CALC)
PLATELET COUNT: 233 THOUSAND/UL (ref 140–400)
POTASSIUM: 4.5 MMOL/L (ref 3.5–5.3)
PROTEIN, TOTAL: 6.7 G/DL (ref 6.1–8.1)
RDW: 12 % (ref 11–15)
RED BLOOD CELL COUNT: 4.37 MILLION/UL (ref 3.8–5.1)
SODIUM: 143 MMOL/L (ref 135–146)
TRIGLYCERIDES: 85 MG/DL
TSH: 3.58 MIU/L
WHITE BLOOD CELL COUNT: 3.9 THOUSAND/UL (ref 3.8–10.8)

## 2024-07-23 ENCOUNTER — TELEPHONE (OUTPATIENT)
Dept: RHEUMATOLOGY | Facility: CLINIC | Age: 54
End: 2024-07-23

## 2024-07-23 NOTE — TELEPHONE ENCOUNTER
Patient called stating she was referred to see  due to a positive ARON result. Per patient she has been diagnosed with breast cancer and she would like to know if that will effect her test result. She would like a callback before she schedules her new patient appointment.

## 2024-07-23 NOTE — TELEPHONE ENCOUNTER
Name and  verified. Pt aware of provider's message. Appointment scheduled.       Future Appointments   Date Time Provider Department Center   2024  9:00 AM  NU[facility] RM1  [facility]   2024 11:00 AM  [facility]I RM2  [facility]   10/4/2024 12:20 PM Esther Mcmahan MD [facility]

## 2024-07-23 NOTE — TELEPHONE ENCOUNTER
She would need to contact her PCP.  I do not have the full clinical picture I would have to see her to evaluate if there is anything autoimmune going on

## 2024-08-08 ENCOUNTER — HOSPITAL ENCOUNTER (OUTPATIENT)
Dept: MAMMOGRAPHY | Facility: HOSPITAL | Age: 54
Discharge: HOME | End: 2024-08-08
Attending: SURGERY
Payer: COMMERCIAL

## 2024-08-08 ENCOUNTER — ANESTHESIA EVENT (OUTPATIENT)
Dept: SURGERY | Facility: HOSPITAL | Age: 54
End: 2024-08-08
Payer: COMMERCIAL

## 2024-08-08 ENCOUNTER — HOSPITAL ENCOUNTER (OUTPATIENT)
Facility: HOSPITAL | Age: 54
Setting detail: HOSPITAL OUTPATIENT SURGERY
Discharge: HOME | End: 2024-08-08
Attending: SURGERY | Admitting: SURGERY
Payer: COMMERCIAL

## 2024-08-08 ENCOUNTER — HOSPITAL ENCOUNTER (OUTPATIENT)
Dept: NUCLEAR MEDICINE | Facility: HOSPITAL | Age: 54
Discharge: HOME | End: 2024-08-08
Attending: SURGERY
Payer: COMMERCIAL

## 2024-08-08 ENCOUNTER — ANESTHESIA (OUTPATIENT)
Dept: SURGERY | Facility: HOSPITAL | Age: 54
End: 2024-08-08
Payer: COMMERCIAL

## 2024-08-08 VITALS
HEIGHT: 62 IN | HEART RATE: 75 BPM | SYSTOLIC BLOOD PRESSURE: 115 MMHG | BODY MASS INDEX: 26.46 KG/M2 | WEIGHT: 143.81 LBS | TEMPERATURE: 98 F | RESPIRATION RATE: 20 BRPM | OXYGEN SATURATION: 99 % | DIASTOLIC BLOOD PRESSURE: 74 MMHG

## 2024-08-08 DIAGNOSIS — C50.912 MALIGNANT NEOPLASM OF LEFT BREAST (HCC): ICD-10-CM

## 2024-08-08 DIAGNOSIS — C50.412 MALIGNANT NEOPLASM OF UPPER-OUTER QUADRANT OF LEFT BREAST IN FEMALE, ESTROGEN RECEPTOR POSITIVE (HCC): Primary | ICD-10-CM

## 2024-08-08 DIAGNOSIS — R52 PAIN: ICD-10-CM

## 2024-08-08 DIAGNOSIS — Z17.0 MALIGNANT NEOPLASM OF UPPER-OUTER QUADRANT OF LEFT BREAST IN FEMALE, ESTROGEN RECEPTOR POSITIVE (HCC): Primary | ICD-10-CM

## 2024-08-08 PROCEDURE — 19281 PERQ DEVICE BREAST 1ST IMAG: CPT | Performed by: SURGERY

## 2024-08-08 PROCEDURE — 76098 X-RAY EXAM SURGICAL SPECIMEN: CPT | Performed by: SURGERY

## 2024-08-08 PROCEDURE — 07B60ZX EXCISION OF LEFT AXILLARY LYMPHATIC, OPEN APPROACH, DIAGNOSTIC: ICD-10-PCS | Performed by: SURGERY

## 2024-08-08 PROCEDURE — 3E0W3KZ INTRODUCTION OF OTHER DIAGNOSTIC SUBSTANCE INTO LYMPHATICS, PERCUTANEOUS APPROACH: ICD-10-PCS | Performed by: SURGERY

## 2024-08-08 PROCEDURE — 78195 LYMPH SYSTEM IMAGING: CPT | Performed by: SURGERY

## 2024-08-08 PROCEDURE — 0HBU0ZZ EXCISION OF LEFT BREAST, OPEN APPROACH: ICD-10-PCS | Performed by: SURGERY

## 2024-08-08 RX ORDER — SCOLOPAMINE TRANSDERMAL SYSTEM 1 MG/1
1 PATCH, EXTENDED RELEASE TRANSDERMAL ONCE
Status: DISCONTINUED | OUTPATIENT
Start: 2024-08-08 | End: 2024-08-08 | Stop reason: HOSPADM

## 2024-08-08 RX ORDER — BUPIVACAINE HYDROCHLORIDE 2.5 MG/ML
INJECTION, SOLUTION EPIDURAL; INFILTRATION; INTRACAUDAL AS NEEDED
Status: DISCONTINUED | OUTPATIENT
Start: 2024-08-08 | End: 2024-08-08 | Stop reason: HOSPADM

## 2024-08-08 RX ORDER — PROCHLORPERAZINE EDISYLATE 5 MG/ML
5 INJECTION INTRAMUSCULAR; INTRAVENOUS EVERY 8 HOURS PRN
Status: DISCONTINUED | OUTPATIENT
Start: 2024-08-08 | End: 2024-08-08

## 2024-08-08 RX ORDER — TRAMADOL HYDROCHLORIDE 50 MG/1
50 TABLET ORAL EVERY 8 HOURS PRN
Qty: 16 TABLET | Refills: 0 | Status: SHIPPED | OUTPATIENT
Start: 2024-08-08

## 2024-08-08 RX ORDER — ONDANSETRON 2 MG/ML
4 INJECTION INTRAMUSCULAR; INTRAVENOUS EVERY 6 HOURS PRN
Status: DISCONTINUED | OUTPATIENT
Start: 2024-08-08 | End: 2024-08-08

## 2024-08-08 RX ORDER — SODIUM CHLORIDE, SODIUM LACTATE, POTASSIUM CHLORIDE, CALCIUM CHLORIDE 600; 310; 30; 20 MG/100ML; MG/100ML; MG/100ML; MG/100ML
INJECTION, SOLUTION INTRAVENOUS CONTINUOUS
Status: DISCONTINUED | OUTPATIENT
Start: 2024-08-08 | End: 2024-08-08

## 2024-08-08 RX ORDER — NALOXONE HYDROCHLORIDE 0.4 MG/ML
0.08 INJECTION, SOLUTION INTRAMUSCULAR; INTRAVENOUS; SUBCUTANEOUS AS NEEDED
Status: DISCONTINUED | OUTPATIENT
Start: 2024-08-08 | End: 2024-08-08

## 2024-08-08 RX ORDER — LIDOCAINE HYDROCHLORIDE 10 MG/ML
INJECTION, SOLUTION INFILTRATION; PERINEURAL AS NEEDED
Status: DISCONTINUED | OUTPATIENT
Start: 2024-08-08 | End: 2024-08-08 | Stop reason: HOSPADM

## 2024-08-08 RX ORDER — HYDROMORPHONE HYDROCHLORIDE 1 MG/ML
0.6 INJECTION, SOLUTION INTRAMUSCULAR; INTRAVENOUS; SUBCUTANEOUS EVERY 5 MIN PRN
Status: DISCONTINUED | OUTPATIENT
Start: 2024-08-08 | End: 2024-08-08

## 2024-08-08 RX ORDER — METOCLOPRAMIDE HYDROCHLORIDE 5 MG/ML
INJECTION INTRAMUSCULAR; INTRAVENOUS AS NEEDED
Status: DISCONTINUED | OUTPATIENT
Start: 2024-08-08 | End: 2024-08-08 | Stop reason: SURG

## 2024-08-08 RX ORDER — ISOSULFAN BLUE 50 MG/5ML
INJECTION, SOLUTION SUBCUTANEOUS AS NEEDED
Status: DISCONTINUED | OUTPATIENT
Start: 2024-08-08 | End: 2024-08-08 | Stop reason: HOSPADM

## 2024-08-08 RX ORDER — DEXAMETHASONE SODIUM PHOSPHATE 4 MG/ML
VIAL (ML) INJECTION AS NEEDED
Status: DISCONTINUED | OUTPATIENT
Start: 2024-08-08 | End: 2024-08-08 | Stop reason: SURG

## 2024-08-08 RX ORDER — ONDANSETRON 2 MG/ML
INJECTION INTRAMUSCULAR; INTRAVENOUS AS NEEDED
Status: DISCONTINUED | OUTPATIENT
Start: 2024-08-08 | End: 2024-08-08 | Stop reason: SURG

## 2024-08-08 RX ORDER — HYDROCODONE BITARTRATE AND ACETAMINOPHEN 5; 325 MG/1; MG/1
1 TABLET ORAL ONCE AS NEEDED
Status: DISCONTINUED | OUTPATIENT
Start: 2024-08-08 | End: 2024-08-08

## 2024-08-08 RX ORDER — ACETAMINOPHEN 500 MG
1000 TABLET ORAL ONCE
Status: DISCONTINUED | OUTPATIENT
Start: 2024-08-08 | End: 2024-08-08 | Stop reason: HOSPADM

## 2024-08-08 RX ORDER — HYDROCODONE BITARTRATE AND ACETAMINOPHEN 5; 325 MG/1; MG/1
2 TABLET ORAL ONCE AS NEEDED
Status: DISCONTINUED | OUTPATIENT
Start: 2024-08-08 | End: 2024-08-08

## 2024-08-08 RX ORDER — ACETAMINOPHEN 500 MG
1000 TABLET ORAL ONCE AS NEEDED
Status: DISCONTINUED | OUTPATIENT
Start: 2024-08-08 | End: 2024-08-08

## 2024-08-08 RX ORDER — LIDOCAINE HYDROCHLORIDE 10 MG/ML
INJECTION, SOLUTION EPIDURAL; INFILTRATION; INTRACAUDAL; PERINEURAL AS NEEDED
Status: DISCONTINUED | OUTPATIENT
Start: 2024-08-08 | End: 2024-08-08 | Stop reason: SURG

## 2024-08-08 RX ORDER — HYDROMORPHONE HYDROCHLORIDE 1 MG/ML
0.4 INJECTION, SOLUTION INTRAMUSCULAR; INTRAVENOUS; SUBCUTANEOUS EVERY 5 MIN PRN
Status: DISCONTINUED | OUTPATIENT
Start: 2024-08-08 | End: 2024-08-08

## 2024-08-08 RX ORDER — LIDOCAINE AND PRILOCAINE 25; 25 MG/G; MG/G
CREAM TOPICAL ONCE
Status: COMPLETED | OUTPATIENT
Start: 2024-08-08 | End: 2024-08-08

## 2024-08-08 RX ORDER — DIAZEPAM 5 MG/1
5 TABLET ORAL AS NEEDED
Status: DISCONTINUED | OUTPATIENT
Start: 2024-08-08 | End: 2024-08-08 | Stop reason: HOSPADM

## 2024-08-08 RX ORDER — HYDROMORPHONE HYDROCHLORIDE 1 MG/ML
0.2 INJECTION, SOLUTION INTRAMUSCULAR; INTRAVENOUS; SUBCUTANEOUS EVERY 5 MIN PRN
Status: DISCONTINUED | OUTPATIENT
Start: 2024-08-08 | End: 2024-08-08

## 2024-08-08 RX ORDER — DIPHENHYDRAMINE HYDROCHLORIDE 50 MG/ML
12.5 INJECTION INTRAMUSCULAR; INTRAVENOUS AS NEEDED
Status: DISCONTINUED | OUTPATIENT
Start: 2024-08-08 | End: 2024-08-08

## 2024-08-08 RX ADMIN — LIDOCAINE HYDROCHLORIDE 50 MG: 10 INJECTION, SOLUTION EPIDURAL; INFILTRATION; INTRACAUDAL; PERINEURAL at 15:00:00

## 2024-08-08 RX ADMIN — DEXAMETHASONE SODIUM PHOSPHATE 4 MG: 4 MG/ML VIAL (ML) INJECTION at 15:05:00

## 2024-08-08 RX ADMIN — SODIUM CHLORIDE, SODIUM LACTATE, POTASSIUM CHLORIDE, CALCIUM CHLORIDE: 600; 310; 30; 20 INJECTION, SOLUTION INTRAVENOUS at 16:24:00

## 2024-08-08 RX ADMIN — ONDANSETRON 4 MG: 2 INJECTION INTRAMUSCULAR; INTRAVENOUS at 16:08:00

## 2024-08-08 RX ADMIN — METOCLOPRAMIDE HYDROCHLORIDE 20 MG: 5 INJECTION INTRAMUSCULAR; INTRAVENOUS at 15:05:00

## 2024-08-08 RX ADMIN — SODIUM CHLORIDE, SODIUM LACTATE, POTASSIUM CHLORIDE, CALCIUM CHLORIDE: 600; 310; 30; 20 INJECTION, SOLUTION INTRAVENOUS at 14:57:00

## 2024-08-08 NOTE — OPERATIVE REPORT
[facility]    PATIENT'S NAME: ANNAMARIA LAWTON   ATTENDING PHYSICIAN: Fabian Martinez M.D.   OPERATING PHYSICIAN: Fabian Martinez M.D.   PATIENT ACCOUNT#:   [id number]    LOCATION:  [address]  MEDICAL RECORD #:   [id number]       YOB: 1970  ADMISSION DATE:       08/08/2024      OPERATION DATE:  08/08/2024    OPERATIVE REPORT      PREOPERATIVE DIAGNOSIS:  Left breast cancer, clinically stage I.  POSTOPERATIVE DIAGNOSIS:  Left breast cancer, clinically stage I.  PROCEDURE:  Left breast needle-localized lumpectomy and left sentinel lymph node biopsy.    ASSISTANT:  DORYS Burt.  Assistant was medically necessary for patient positioning, suctioning, and retraction of tissues.      ANESTHESIA:  General.    INDICATIONS:  The patient is a 54-year-old lady diagnosed with left breast cancer.  She presents for breast conservation.  The surgery of left breast needle-localized lumpectomy and left sentinel lymph node biopsy was discussed with her in detail as well as the potential risks and complications, not limited to infection, bleeding, anesthetic risks, heart attack, stroke, or death.  The possibility of requiring another surgery based on final pathology was also discussed.  All her questions were answered to her satisfaction.  She is agreeable to proceed.    OPERATIVE TECHNIQUE:  Informed consent was obtained.  She was taken to the operating room, SCDs placed, IV antibiotics given, and general anesthesia was induced.  I injected 5 mL of Lymphazurin blue dye in the left breast subareolar area. Breast massage was carried out for 5 minutes.  The left breast and axilla were prepped and draped in the usual sterile fashion.  After infiltration with 1% lidocaine, I made a curvilinear incision in the left axilla.  Incision made using a scalpel and carried down through the clavipectoral fascia.  One sentinel lymph node was identified and sent to Pathology for routine analysis.  I examined  the axilla for any further hot, palpable, or blue lymph nodes, and there were none noted.  I turned my attention towards the left breast.  I made a curvilinear incision in the left breast upper outer quadrant.  Incision was made using a scalpel and carried down to the breast parenchyma.  Using electrocautery, I dissected around the guidewire.  In this fashion, the specimen was removed in 1 piece and tagged in the following manner:  Long stitch lateral, short stitch superficial.  I used the Faxitron to x-ray the specimen, and the clip was noted in there.  I also took margins from the following locations:  Superior, inferior, medial, and deep based on the x-ray.  I examined the lumpectomy cavity, and no abnormal areas were palpated.  In regard to the margins, a stitch was placed in each true margin, and each margin was placed in a container marked from where it was taken.  Hemoclips were placed in the lumpectomy cavity.  The lumpectomy cavity was irrigated, suctioned dry, and examined for bleeding.  Hemostasis was meticulously maintained using electrocautery.  SNoW was used as well for hemostasis.  The dermis reapproximated with interrupted 3-0 Vicryl sutures, skin with 4-0 Vicryl subcuticular stitch.  I examined the axilla.  Some clips were used for hemostasis.  It was irrigated, suctioned dry, and examined for bleeding, and hemostasis was meticulously maintained.  Dermis was reapproximated with interrupted 3-0 Vicryl sutures, skin with 4-0 Vicryl subcuticular stitch.  Steri-Strips and light compression dressings were applied.  At the end of the procedure, sponge and needle counts were correct.  Patient tolerated the procedure well.  She was taken to recovery room in stable condition, awake.  I discussed my findings with the patient's  who accompanied her.    Dictated By Fabian Martinez M.D.  d: 08/08/2024 16:49:56  t: 08/08/2024 17:22:07  Job [id number]  /

## 2024-08-08 NOTE — BRIEF OP NOTE
Pre-Operative Diagnosis: MALIGNANT NEOPLASM OF LEFT BREAST     Post-Operative Diagnosis: MALIGNANT NEOPLASM OF LEFT BREAST      Procedure Performed:   LEFT BREAST NEEDLE LOCALIZATION LUMPECTOMY WITH LEFT SENTINEL LYMPH NODE BIOPSY    Surgeons and Role:     * Fabian Martinez MD - Primary    Assistant(s):  Surgical Assistant.: Sarah Valera, DORYS    Assistant was medically necessary for patient positioning, suctioning and retraction of tissues     Surgical Findings: see above     Specimen: to pathology     Estimated Blood Loss: Blood Output: 20 mL (8/8/2024  4:36 PM)      Fabian Martinez MD  8/8/2024  4:45 PM

## 2024-08-08 NOTE — ANESTHESIA POSTPROCEDURE EVALUATION
[facility]    Sarah Edmonds Patient Status:  Hospital Outpatient Surgery   Age/Gender 54 year old female MRN [id number]   Location [facility] SURGERY Attending Fabian Martinez MD   Hosp Day # 0 PCP Manohar Kang MD       Anesthesia Post-op Note    LEFT BREAST NEEDLE LOCALIZATION LUMPECTOMY WITH LEFT SENTINEL LYMPH NODE BIOPSY    Procedure Summary       Date: 08/08/24 Room / Location:  MAIN OR 06 / [facility] MAIN OR    Anesthesia Start: 1457 Anesthesia Stop: 1648    Procedure: LEFT BREAST NEEDLE LOCALIZATION LUMPECTOMY WITH LEFT SENTINEL LYMPH NODE BIOPSY (Left: Breast) Diagnosis: (MALIGNANT NEOPLASM OF LEFT BREAST)    Surgeons: Fabian Martinez MD Anesthesiologist: Ish Andino MD    Anesthesia Type: general ASA Status: 2            Anesthesia Type: general    Vitals Value Taken Time   BP 98/68 08/08/24 1657   Temp 98.8 °F (37.1 °C) 08/08/24 1657   Pulse 89 08/08/24 1657   Resp 18 08/08/24 1657   SpO2 100 % 08/08/24 1657       Patient Location: PACU    Anesthesia Type: general    Airway Patency: patent    Postop Pain Control: adequate    Mental Status: mildly sedated but able to meaningfully participate in the post-anesthesia evaluation    Nausea/Vomiting: none    Cardiopulmonary/Hydration status: stable euvolemic    Complications: no apparent anesthesia related complications    Postop vital signs: stable    Comments: Pt breathing comfortably, VSS, Report to RN.     Dental Exam: Unchanged from Preop    Patient to be discharged from PACU when criteria met.

## 2024-08-08 NOTE — H&P
[facility]   part of [facility]    History and Physical    Sarah Edmonds Patient Status:  Hospital Outpatient Surgery    1970 MRN [id number]   Location [facility] PRE OP HOLDING Attending Fabian Martinez MD   Hosp Day # 0 PCP Manohar Kang MD     Date:  2024  Date of Admission:  2024    History provided by:patient  HPI:   No chief complaint on file.    HPI    History     Past Medical History:    Atypical chest pain    BRCA1 negative    BRCA2 negative    Breast cancer (HCC)    Right breast IDC    Gastroesophageal reflux disease    Hx of motion sickness    PAC (premature atrial contraction)    Tinnitus    Unspecified abdominal pain    Visual impairment    glasses/contacts     Past Surgical History:   Procedure Laterality Date    Benign biopsy left Left 2019    Benign biopsy left Left 2020    us bx          x3    Excise excess skin tissue,other N/A 12/15/2015    Procedure: LABIAL ADHESIONS TAKE DOWN / LABIALPLASTY;  Surgeon: Sushant Palafox MD;  Location: [facility]    Hysteroscopy,with endometrial N/A 12/15/2015    Procedure: HYSTEROSCOPY WITH ENDOMETRIAL ABLATION;  Surgeon: Sushant Palafox MD;  Location: [facility]    Lumpectomy right Right 10/2019    IDC    Other surgical history  10/16/2019    Right breast lumpectomy, sln bx    Radiation right Right 2019    Tubal ligation       Family History   Problem Relation Age of Onset    Cancer Father     Cancer Mother     Breast Cancer Mother 52        age of dx 52    Breast Cancer Paternal Aunt 60        age of dx 60's     Social History:  Social History     Socioeconomic History    Marital status:    Tobacco Use    Smoking status: Never    Smokeless tobacco: Never   Vaping Use    Vaping status: Never Used   Substance and Sexual Activity    Alcohol use: Yes     Comment: occasional    Drug use: No     Allergies/Medications:   Allergies: No Known Allergies  Medications Prior to  Admission   Medication Sig    Phentermine HCl 37.5 MG Oral Tab Take 1 tablet (37.5 mg total) by mouth every morning before breakfast.    PANTOPRAZOLE 40 MG Oral Tab EC TAKE 1 TABLET BY MOUTH BEFORE BREAKFAST    LEVOTHYROXINE 25 MCG Oral Tab TAKE 1 TABLET BY MOUTH BEFORE BREAKFAST.    tamoxifen 20 MG Oral Tab Take 1 tablet (20 mg total) by mouth daily.    ciprofloxacin-dexamethasone 0.3-0.1 % Otic Suspension Place 4 drops into the right ear 2 (two) times daily.       Review of Systems:   Review of Systems    Physical Exam:   Vital Signs:  Blood pressure 115/77, pulse 74, temperature 98 °F (36.7 °C), temperature source Temporal, resp. rate 16, height 5' 2\" (1.575 m), weight 143 lb 12.8 oz (65.2 kg), SpO2 100%.  Physical Exam  Cervical Papanicolaou to be done in MD's office    Results:     Lab Results   Component Value Date    WBC 3.9 07/18/2024    HGB 12.3 07/18/2024    HCT 39.2 07/18/2024     07/18/2024    CREATSERUM 0.58 07/18/2024    BUN 8 07/18/2024     07/18/2024    K 4.5 07/18/2024     07/18/2024    CO2 27 07/18/2024    GLU 91 07/18/2024    CA 9.3 07/18/2024    ALB 4.2 07/18/2024    ALKPHO 41 07/18/2024    BILT 0.6 07/18/2024    TP 6.7 07/18/2024    AST 19 07/18/2024    ALT 15 07/18/2024    INR 1.0 07/18/2024    PT 10.5 07/18/2024    TSH 3.58 07/18/2024     06/17/2019    MG 2.3 02/14/2023     NM SENTINEL NODE/LYMPHATIC  (CPT=78195)    Result Date: 8/8/2024  CONCLUSION:  Left axillary lymph node identified   LOCATION:  [facility]   Dictated by (CST): Braden Cheng MD on 8/08/2024 at 12:18 PM     Finalized by (CST): Braden Cheng MD on 8/08/2024 at 12:18 PM            Assessment/Plan:       HISTORY OF PRESENT ILLNESS: Ms. Edmonds is a 54 year old woman who presents with a screen detected left breast cancer . She denies any palpable masses, skin changes, or axillary adenopathy. She denies breast pain. She does have any significant past history for breast diseases. She has not had a breast  biopsy.  She reports that the right nipple os crusty to her for a few months.    In 2019, she had right breast cancer treated with breast conservation.    Breast cancer risk factors: She achieved menarche at age 13 and is postmenopausal. She did use oral contraceptives for approximately 4 years. She did not use hormone replacement therapy. She denies any infertility treatment to achieve pregnancy. She does have a family history significant for reproductive maligancies.   Mother had breast cancer at 52  Paternal aunt had breast cancer at 51    Past Medical History:   Diagnosis Date   BRCA1 negative   BRCA2 negative   Breast cancer (HCC) 10/2019   Right breast IDC   Tinnitus   Visual impairment   glasses/contacts     Current Outpatient Medications   Medication Sig Dispense Refill   TAMOXIFEN 20 MG Oral Tab TAKE 1 TABLET BY MOUTH EVERY DAY 90 tablet 3   sucralfate 1 GM/10ML Oral Suspension Take 4 times a day: 1 hour before meals and at bedtime for 7 days. 414 mL 0   Probiotic Product (ALIGN) 4 MG Oral Cap Take 1 capsule by mouth daily. 30 capsule 2   levothyroxine 25 MCG Oral Tab Take 1 tablet by mouth daily.   pantoprazole 40 MG Oral Tab EC Take 1 tablet by mouth before breakfast.     Past Surgical History:   Procedure Laterality Date   BENIGN BIOPSY LEFT Left 2019   BENIGN BIOPSY LEFT Left 2020   us bx      x3   EXCISE EXCESS SKIN TISSUE,OTHER N/A 12/15/2015   Procedure: LABIAL ADHESIONS TAKE DOWN / LABIALPLASTY; Surgeon: Sushant Palafox MD; Location: [facility]   HYSTEROSCOPY,WITH ENDOMETRIAL N/A 12/15/2015   Procedure: HYSTEROSCOPY WITH ENDOMETRIAL ABLATION; Surgeon: Sushant Palafox MD; Location: [facility]   LUMPECTOMY RIGHT Right 10/2019   IDC   NEEDLE BIOPSY LEFT 2020   Breast tissue with stromal fibrosis, microcysts, sclerosing adenosis and calcifications   NEEDLE BIOPSY LEFT 2019   MRI: Fragments of breast tissue with proliferative fibrocystic change  including microcyst formation, duct ectasia, columnar cell change, and pseudo-angiomatous stromal hyperplasia   NEEDLE BIOPSY RIGHT 11/03/2021   Fragments of breast tissue with proliferative fibrocystic changes with lobular involution and associated calcifications, fibroadenomatoid stromal change, hyalinized cyst with dystrophic calcifications   NEEDLE BIOPSY RIGHT 08/09/2019   Invas Ductal CA   OTHER SURGICAL HISTORY 10/16/2019   Right breast lumpectomy, sln bx   RADIATION RIGHT Right 12/2019   TUBAL LIGATION     Allergies: No Known Allergies  Family History   Problem Relation Age of Onset   Cancer Father   Cancer Mother   Breast Cancer Mother 52   age of dx 52   Breast Cancer Paternal Aunt 60   age of dx 60's     Social History:  Social History  Tobacco Use  Smoking status: Never  Smokeless tobacco: Never  Vaping Use  Vaping status: Never Used  Alcohol use: Yes  Comment: occasional  Drug use: No    REVIEW OF SYSTEMS: She denies any HEENT, cardiovascular, pulmonary, gastrointestinal, genitourinary, musculoskeletal, hematologic, endocrine, or integumentary complaints.    PHYSICAL EXAMINATION:  The patient is alert, oriented, well-nourished, well-developed woman who appears her stated age. On her HEENT examination, Normocephalic/atraumatic, neck supple, no lymphadenopathy, thyroid non-tender and without palpable masses/nodules or enlargement with clear conjunctiva, non-icteric.  Her abdomen is soft, non-tender, bowel sounds normal, no masses. Her bilateral lower extremities shows no deformity, cyanosis, or significant edema. Both nipples are everted without discharge. There are no dominant masses or significant focal nodularity in either breast. There are no skin changes on either side. The left axilla shows no adenopathy. The right axilla shows no adenopathy.  Well healed incisions right breast and right axilla.  Right nipple - mild irritation    RADIOLOGIC WORK-UP: radiology films and reports were reviewed.      BILATERAL MAMMOGRAPHY:   BILATERAL SCREENING MAMMOGRAM WITH CAD WITH TOMOSYNTHESIS  ----------------------------------------------------------------------------------------------------    ------------  CLINICAL INDICATION: Screening mammogram on a 53 years old woman. History of right lumpectomy for  invasive ductal carcinoma in 2019    FAMILY HISTORY: Mother and a paternal aunt    COMPARISON: [facility] dating back to 4/24/2020    TECHNIQUE: Bilateral screening digital mammographic views with tomosynthesis. Images were checked  with the [employer] CAD system.   ----------------------------------------------------------------------------------------------------    ------------  FINDINGS:     BREAST DENSITY C: The breasts are heterogeneously dense, which may obscure small masses. Given the  density of the breasts, which may decrease sensitivity of mammography, annual physical exam is of  increased importance in this patient.     Postsurgical changes consistent with lumpectomy are noted in the right breast are stable in  comparison.    There are no suspicious masses, microcalcifications or areas of architectural distortion suggestive  of malignancy. Both visualized axillae are unremarkable.   ----------------------------------------------------------------------------------------------------    ------------  IMPRESSION AND RECOMMENDATION:    No mammographic evidence of malignancy. If there is no palpable concern, the patient should return  in 1 year for an annual screening mammogram.        ANY FURTHER EVALUATION SHOULD BE BASED ON CLINICAL ASSESSMENT    BI-RADS CATEGORY 2: Benign       Narrative & Impression   DATE OF SERVICE: 05.20.2024  BILATERAL BREAST MRI WITHOUT AND WITH CONTRAST   ----------------------------------------------------------------------------------------  CLINICAL INDICATION: Screening breast MRI. The patient has a history of right breast IDC status  post lumpectomy. The patient does not report any breast  complaints at the time of this examination.    COMPARISON: Prior breast MRIs dating back to 8/26/2019; prior additional breast imaging dating back  to 2020.    TECHNIQUE: Bilateral breast MRI was performed using a dedicated breast coil. The images were  obtained prior to and following the administration of intravenous gadolinium. Precontrast imaging  includes axial T1, axial and sagittal STIR images. Serial dynamic pre- and postcontrast enhanced 3-D  VIBE gradient echo sequences with fat suppression were obtained in the axial projection.   Post-processing includes axial subtraction, sagittal MPR and 3-D MIP images. Kinetic time-signal  intensity flow curves were also analyzed using [employer] software.    Vial size: 7.5 mL   Injected amount: 7.0 mL   Discarded amount: 0.5 mL     ----------------------------------------------------------------------------------------   FINDINGS: The breast are composed of heterogeneous fibroglandular tissue.There is mild and  symmetric background parenchymal enhancement within both breasts.    LEFT BREAST: In the central outer mid left breast, there is a 0.3 cm single dominant enhancing focus  with associated T2 hyperintense signal and persistent and plateau delayed enhancement kinetics  (axial image 50); this is not definitively identified on prior examinations. There is no left  axillary lymphadenopathy.    RIGHT BREAST: There is no suspicious enhancing focus, mass, or non-mass enhancement. There is no  right axillary lymphadenopathy.    EXTRAMAMMARY FINDINGS:   None.    ----------------------------------------------------------------------------------------  IMPRESSION/RECOMMENDATION:  Indeterminate 0.3 cm enhancing focus in the outer central left breast. Recommend MR guided biopsy.    No MR evidence of malignancy in the right breast.    BI-RADS Final Assessment Category 4: Suspicious.  Management Recommendation: Biopsy should be performed in the absence of clinical  contraindication.    Results and recommendations were discussed with the patient at the time of examination. The  Department of radiology will coordinate scheduling the biopsy with the patient.  -------------------------------------------------------  This was interpreted by Tangela Ceja M.D.     Addendum, 6/10/2024 8:07 AM    Final pathology of focus of enhancement in the central outer left breast (heart clip):  Invasive ductal carcinoma. Grade 1 (Tubules: 3, Nuclear: 1, Mitoses: 1). Largest focus of tumor  measures 4 mm (0.4 cm); 5% of submitted tissue.   Atypical ductal hyperplasia (ADH).   Pathology is malignant and concordant with imaging findings.     RECOMMENDATIONS: Recommend surgical management. Results relayed to the patient over telephone by  Dr. North Brown at 6/10/2024 10:15 AM at the time of this dictation. Patient was transferred to a  care coordinator for assistance in scheduling appropriate surgical and oncologic appointment    Mammographic wire localization may be performed as clinically indicated.    PATHOLOGY ADDENDUM ENDS   Addended by North Brown MD on 6/10/2024 10:21 AM     Study Result    Narrative   DATE OF SERVICE: 06.06.2024  PROCEDURE MRI biopsy  1. Biopsy of the left breast, percutaneous, vacuum assisted biopsy device, using imaging guidance.  2. MRI guidance for biopsy, imaging supervision and interpretation.  3. Image guided placement, metallic localization clip, percutaneous, during breast biopsy.  4. Post clip left digital diagnostic mammogram    INDICATION: 54 years -old woman presents with a focus of enhancement in the central outer left  breast for which MRI guided core biopsy is recommended.    DESCRIPTION: Written, informed consent was obtained, including discussion of the risks, benefits  and alternatives. A time out was performed to verify correct patient and procedure.     Contrast: Gadovist was administered via intravenous access.  Vial size: 7.5 mL  Injected amount:  7 mL  Discarded amount: 0.5 mL    Target: focus.  Location: Central outer.  Approach: Lateral..  Anesthesia: Local buffered Lidocaine.  Needle: 9 G Haider ABRAHAM.  Sample number: 10.  Marker: Heart.  Marker migration on post-procedure mammogram: None.    Specimen was sent to surgical pathology.    The patient tolerated the procedure without complication and was given verbal and written  instructions regarding post-procedure wound care.   Impression   IMPRESSION:   1. Successful left breast MRI guided, vacuum assisted core needle biopsy of focus of enhancement in  the central outer breast.  2. Successful marker clip placement with confirmation by left digital diagnostic mammogram.  3. See addendum for radiology pathology correlation.    BI-RADS Final Assessment Category: Post Procedure Mammograms for Marker Placement.  Management Recommendation: Assess radiologic/pathologic concordance.     PATHOLOGY:   Left breast, central, slightly outer, MRI-guided needle core biopsy:  Invasive ductal carcinoma (see comment).  -Grade 1 (Tubules: 3, Nuclear: 1, Mitoses: 1).   -Largest focus of tumor measures 4 mm (0.4 cm); 5% of submitted tissue.  -Atypical ductal hyperplasia (ADH).        IMPRESSION: Newly diagnosed left breast infiltrating ductal carcinoma, clinically T1, N 0    DISCUSSION AND PLAN:   Clinically stage 1    I had a lengthy discussion with the patient regarding the diagnosis of breast cancer. We discussed the biology of breast cancer as well as the difference between in situ and invasive disease.     We discussed the role of breast MRI as well the advantages and disadvantages of breast MRI. If MRI shows multifocal disease based on imaging or potential other biopsies, she will need a mastectomy.    We discussed the treatment options of breast cancer in regards to surgery, radiation, chemotherapy, endocrine therapy and herceptin.     In regards to surgery, we discussed the options of lumpectomy and mastectomy and the  difference between each option. The possibility of another surgery to achieve clear margins was also discussed. If she opts for mastectomy, the option of reconstruction was discussed. She does express understanding of the above.   We discussed the role of radiation therapy and if she opts for lumpectomy, she will need radiation therapy and the length and course of radiation therapy will depend on the final pathology. We also discussed situations where radiation therapy is needed after mastectomy (large tumor size, positive lymph nodes and positive margins on a mastectomy). However, the final decision regarding raditation therapy would be made after surgery and she expresses understanding of this.     In regards to chemotherapy, I would send her to medical oncology after surgery and based on the final pathology report it would be determined if chemotherapy would provide any additional benefit.     We also discussed the role of endocrine therapy which would be based on the ER/SC status of the tumor. We also discussed Herceptin and that 25 - 30 % of breast cancer are Her 2 positive.     I have given her a copy of the breast cancer treatment handbook and she is urged to call me with any questions.    All their questions were answered to their satisfaction.    I also explained that she has the option of obtaining a second opinion.    She would like breast conservation   Genetics reviewed under media tab - negative   The procedure of left breast needle localization lumpectomy, left sentinel lymph node biopsy was discussed with her in detail as well as potential risks and complications which are not limited to infection, bleeding, injury to nerves and blood vessels of the axillary region, heart attack, stroke and death.  The possibility of the sentinel lymph node(s) being  positive on the final pathology a few days later was also discussed.  In this situation we would discuss the possibility that she would need to go back  for axillary lymph node dissection on another day.  The other possibility of having to go back to surgery to achieve clear margins on the lumpectomy was also explained.  I discussed the technique of sentinel lymph node biopsy with injection of technetium labeled sulfur colloid and blue dye.   All her questions were answered to her satisfaction and she expresses understanding of all of the above.              Fabian Martinez MD  8/8/2024

## 2024-08-08 NOTE — DISCHARGE SUMMARY
Pt is s/p left breast conservation and left slnb  She tolerated surgery well without any complications  She will be discharged in stable condition

## 2024-08-08 NOTE — DISCHARGE INSTRUCTIONS
BREAST SURGERY POST-OPERATIVE INSTRUCTIONS   Lumpectomy        The following are some guidelines for you to help in your recovery after breast surgery. Please keep in mind that everyone recovers in different ways, so please call if you have any questions.    General Guidelines  -Listen to your body and rest when you feel tired.  -You should experience mild to moderate discomfort once the anesthetic has worn off.  -You will have a good support bra placed on you after surgery. It is a good idea to wear          this bra day and night for 2-3 days (or longer) after surgery. This helps immobilize the breast and will help alleviate discomfort. If the breast does not move very much, it will not hurt as much.     Medications  -You will be given a prescription for a narcotic pain medication. Some patients will experience very little discomfort and will not require this narcotic. You may take Tylenol or extra-strength Tylenol (acetaminophen) instead. I would prefer that you AVOID aspirin, ibuprofen (Advil), Excedrin and Celebrex.  -The narcotic pain medication can cause constipation so it is important to keep well-hydrated by drinking plenty of fluids. You should not consume any alcohol while you are taking narcotics. If you are constipated, you may take an over-the counter stool softener (Colace).    Activity  -You may resume most daily activities the day after surgery.  -For mastectomy patients, arm exercises can begin 2-3 days after surgery. It is important to start off slowly and gradually increase your range of motion. If you have a drain in place, it is important to be cautious and perform only very light exercise and minimize any significant amount of repetitive activity. Too much activity can increase the amount of time that the drain needs to stay in. If you have had breast reconstruction, you need to check with the plastic surgeon regarding when you can start the exercises.  -You can drive only when you are pain  free, can react to an emergency situation, AND when you are not taking any narcotics.    You should not lift anything over 5 pounds on the affected side or any other strenuous activity such as exercise until you have cleared it with me.    Wound Care  -You should keep the dressing on the incision for AT LEAST 24 hours after surgery. After that time you may remove the dressing and shower. After your shower, pat the incision dry. Do not apply any creams or ointments.  -You should not take tub baths or do any swimming.  -You will find steri-strips on the incision when you remove the dressings. Please do not remove the steri-strips-they will usually peel off within 10 days. If they have not come off by then, you may slowly peel them off.  The incision is closed with absorbable sutures that are under the skin; on occasion, I may place a suture that may need to be removed in the office 10 days after surgery.    -You may experience some minor swelling and numbness along the incision.    Pathology Results  I will call you once I have received the pathology results- this usually takes 5 days.    Follow-up appointment  Please call my office at [phone] to schedule a follow-up appointment for 7-10 days following your procedure.

## 2024-08-08 NOTE — ANESTHESIA PREPROCEDURE EVALUATION
PRE-OP EVALUATION    Patient Name: Sarah Edmonds    Admit Diagnosis: MALIGNANT NEOPLASM OF LEFT BREAST    Pre-op Diagnosis: MALIGNANT NEOPLASM OF LEFT BREAST    LEFT BREAST NEEDLE LOCALIZATION LUMPECTOMY WITH LEFT SENTINEL LYMPH NODE BIOPSY    Anesthesia Procedure: LEFT BREAST NEEDLE LOCALIZATION LUMPECTOMY WITH LEFT SENTINEL LYMPH NODE BIOPSY (Left)    Surgeons and Role:     * Fabian Martinez MD - Primary    Pre-op vitals reviewed.  Temp: 98 °F (36.7 °C)  Pulse: 74  Resp: 16  BP: 115/77  SpO2: 100 %  Body mass index is 26.3 kg/m².    Current medications reviewed.  Hospital Medications:   [Transfer Hold] acetaminophen (Tylenol Extra Strength) tab 1,000 mg  1,000 mg Oral Once    [Transfer Hold] scopolamine (Transderm-Scop) 1 MG/3DAYS patch 1 patch  1 patch Transdermal Once    lactated ringers infusion   Intravenous Continuous    ceFAZolin (Ancef) 2g in 10mL IV syringe premix  2 g Intravenous Once    [Transfer Hold] diazePAM (Valium) tab 5 mg  5 mg Oral PRN    [COMPLETED] lidocaine-prilocaine (Emla) 2.5-2.5 % cream   Topical Once    ceFAZolin (Ancef) 2 g/10mL IV syringe premix           Outpatient Medications:     Medications Prior to Admission   Medication Sig Dispense Refill Last Dose    Phentermine HCl 37.5 MG Oral Tab Take 1 tablet (37.5 mg total) by mouth every morning before breakfast. 90 tablet 0 Past Month    PANTOPRAZOLE 40 MG Oral Tab EC TAKE 1 TABLET BY MOUTH BEFORE BREAKFAST 90 tablet 0 8/7/2024    LEVOTHYROXINE 25 MCG Oral Tab TAKE 1 TABLET BY MOUTH BEFORE BREAKFAST. 90 tablet 0 8/7/2024    tamoxifen 20 MG Oral Tab Take 1 tablet (20 mg total) by mouth daily. 90 tablet 1 Past Month    ciprofloxacin-dexamethasone 0.3-0.1 % Otic Suspension Place 4 drops into the right ear 2 (two) times daily. 7.5 mL 0        Allergies: Patient has no known allergies.      Anesthesia Evaluation    Patient summary reviewed.    Anesthetic Complications  (-) history of anesthetic complications          GI/Hepatic/Renal      (+) GERD                           Cardiovascular        Exercise tolerance: good     MET: >4                                           Endo/Other           (+) hypothyroidism                       Pulmonary    Negative pulmonary ROS.                       Neuro/Psych    Negative neuro/psych ROS.                                  Past Surgical History:   Procedure Laterality Date    Benign biopsy left Left 2019    Benign biopsy left Left 2020    us bx          x3    Excise excess skin tissue,other N/A 12/15/2015    Procedure: LABIAL ADHESIONS TAKE DOWN / LABIALPLASTY;  Surgeon: Sushant Palafox MD;  Location: [facility]    Hysteroscopy,with endometrial N/A 12/15/2015    Procedure: HYSTEROSCOPY WITH ENDOMETRIAL ABLATION;  Surgeon: Sushant Palafox MD;  Location: [facility]    Lumpectomy right Right 10/2019    IDC    Other surgical history  10/16/2019    Right breast lumpectomy, sln bx    Radiation right Right 2019    Tubal ligation       Social History     Socioeconomic History    Marital status:    Tobacco Use    Smoking status: Never    Smokeless tobacco: Never   Vaping Use    Vaping status: Never Used   Substance and Sexual Activity    Alcohol use: Yes     Comment: occasional    Drug use: No     History   Drug Use No     Available pre-op labs reviewed.  Lab Results   Component Value Date    WBC 3.9 2024    RBC 4.37 2024    HGB 12.3 2024    HCT 39.2 2024    MCV 89.7 2024    MCH 28.1 2024    MCHC 31.4 (L) 2024    RDW 12.0 2024     2024     Lab Results   Component Value Date     2024    K 4.5 2024     2024    CO2 27 2024    BUN 8 2024    CREATSERUM 0.58 2024    GLU 91 2024    CA 9.3 2024            Airway      Mallampati: II  Mouth opening: >3 FB  TM distance: 4 - 6 cm  Neck ROM: full Cardiovascular    Cardiovascular exam  normal.         Dental             Pulmonary    Pulmonary exam normal.                 Other findings              ASA: 2   Plan: general  NPO status verified and patient meets guidelines.          Plan/risks discussed with: patient                Present on Admission:  **None**

## 2024-08-08 NOTE — ANESTHESIA PROCEDURE NOTES
Airway  Date/Time: 8/8/2024 3:04 PM  Urgency: elective    Airway not difficult    General Information and Staff    Patient location during procedure: OR  Anesthesiologist: Ish Andino MD  Resident/CRNA: Dominique Goodman CRNA  Performed: CRNA   Performed by: Dominique Goodman CRNA  Authorized by: Ish Andino MD      Indications and Patient Condition  Indications for airway management: anesthesia  Spontaneous Ventilation: absent  Sedation level: deep  Preoxygenated: yes  Patient position: sniffing  Mask difficulty assessment: 1 - vent by mask    Final Airway Details  Final airway type: supraglottic airway      Successful airway: classic  Size 3       Number of attempts at approach: 1

## 2024-08-08 NOTE — IMAGING NOTE
Assisted  with mammography guided needle localization of the left breast.   Sarah \"Antonette\" Kendal identified with spelling of name and date of birth.   Medications and allergies reviewed. NKDA reported.    History: left breast-Invasive ductal carcinoma   Surgery: LEFT BREAST NEEDLE LOCALIZATION LUMPECTOMY WITH LEFT SENTINEL LYMPH NODE BIOPSY     Order verified.  Procedure explained and questions answered. Sarah Edmonds verbalized understanding and agreement.  Written educational materials provided.   1059: Written consent obtained.     Awaiting radiologist availability    1109: Scans taken by Gabby- mammography technologist    1110: Dr. Rowell  present    1110: Time out complete.    1110: Site prepped in a sterile manner.   1111:Lidocaine administered for anesthetic affect.  1112: Tuloc 20G x 90mm needle placed- left breast    Emotional support provided.  Sarah Edmonds tolerated procedure well.     Site cleaned.  Wire secured with pink clip, steri strips, sterile 4x4 gauze dressing, and Tegaderm.     Sarah Edmonds transported via wheelchair to pre-op/surgery holding in stable condition. Ms. Edmonds without complaints or concerns at this time.

## 2024-08-29 DIAGNOSIS — E03.8 SUBCLINICAL HYPOTHYROIDISM: ICD-10-CM

## 2024-08-29 DIAGNOSIS — K21.9 GASTROESOPHAGEAL REFLUX DISEASE, UNSPECIFIED WHETHER ESOPHAGITIS PRESENT: ICD-10-CM

## 2024-08-29 RX ORDER — LEVOTHYROXINE SODIUM 25 UG/1
25 TABLET ORAL
Qty: 90 TABLET | Refills: 0 | Status: SHIPPED | OUTPATIENT
Start: 2024-08-29

## 2024-08-29 RX ORDER — PANTOPRAZOLE SODIUM 40 MG/1
40 TABLET, DELAYED RELEASE ORAL
Qty: 90 TABLET | Refills: 0 | Status: SHIPPED | OUTPATIENT
Start: 2024-08-29

## 2024-08-29 NOTE — TELEPHONE ENCOUNTER
A refill request was received for:  Requested Prescriptions     Pending Prescriptions Disp Refills    LEVOTHYROXINE 25 MCG Oral Tab [Pharmacy Med Name: LEVOTHYROXINE 25 MCG TABLET] 90 tablet 0     Sig: TAKE 1 TABLET BY MOUTH BEFORE BREAKFAST.    PANTOPRAZOLE 40 MG Oral Tab EC [Pharmacy Med Name: PANTOPRAZOLE SOD DR 40 MG TAB] 90 tablet 0     Sig: TAKE 1 TABLET BY MOUTH BEFORE BREAKFAST     Last refill date:  05/28/2024  Qty: 90  Dx:   Subclinical hypothyroidism      Gastroesophageal reflux disease, unspecified whether esophagitis present   Last office visit: 07/11/2024   When is follow up due: 3 months          Future Appointments   Date Time Provider Department Center   10/4/2024 12:20 PM Esther Mcmahan MD [facility]

## 2024-09-13 ENCOUNTER — PATIENT MESSAGE (OUTPATIENT)
Dept: FAMILY MEDICINE CLINIC | Facility: CLINIC | Age: 54
End: 2024-09-13

## 2024-09-13 DIAGNOSIS — H60.331 ACUTE SWIMMER'S EAR OF RIGHT SIDE: ICD-10-CM

## 2024-09-13 NOTE — TELEPHONE ENCOUNTER
A refill request was received for:  Requested Prescriptions     Pending Prescriptions Disp Refills    ciprofloxacin-dexamethasone 0.3-0.1 % Otic Suspension 7.5 mL 0     Sig: Place 4 drops into the right ear 2 (two) times daily.     Last refill date:  7/11/24  Qty: 7.5mL - No refills  Dx: Swimmers Ear  Last office visit: 7/11/24  When is follow up due: 10/11/2024    COPIED: Is it possible to get a refill on the Ciprodex? My ear is not healing and I am about out.       Future Appointments   Date Time Provider Department Center   10/4/2024 12:20 PM Esther Mcmahan MD [facility]

## 2024-09-16 RX ORDER — CIPROFLOXACIN AND DEXAMETHASONE 3; 1 MG/ML; MG/ML
4 SUSPENSION/ DROPS AURICULAR (OTIC) 2 TIMES DAILY
Qty: 7.5 ML | Refills: 0
Start: 2024-09-16

## 2024-09-16 NOTE — TELEPHONE ENCOUNTER
We typically don't continue patients on this longer than 1 week. If she is still having symptoms from July, she should see ENT or follow-up with me. Thanks.

## 2024-09-25 ENCOUNTER — PATIENT MESSAGE (OUTPATIENT)
Dept: FAMILY MEDICINE CLINIC | Facility: CLINIC | Age: 54
End: 2024-09-25

## 2024-09-25 DIAGNOSIS — R94.6 ABNORMAL THYROID EXAM: Primary | ICD-10-CM

## 2024-09-25 NOTE — TELEPHONE ENCOUNTER
From: Sarah Edmonds  To: Tami Wilder  Sent: 9/25/2024 2:16 PM CDT  Subject: Finding on CT scan    Nathan Garrett!    I had a CT scan today to do the mapping for my radiation therapy. 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 Can you please check out the report and let me know if I need to do anything with this information?    Also, does this change how we are approaching the positive ARON test result? I do have an appointment scheduled with the rheumatologist in the next couple of weeks.    Thanks for your time    Antonette Edmonds ([birth year])

## 2024-09-25 NOTE — TELEPHONE ENCOUNTER
A heterogenous thyroid can often be found in autoimmune thyroid conditions like Hashimoto's thyroiditis. We checked her TSH on last labs and it was normal, so she does not need to be on thyroid medication at this time. I will place orders for thyroid antibody testing to be done to confirm diagnosis. She should still see rheumatology as scheduled.

## 2024-10-09 LAB
THYROGLOBULIN ANTIBODIES: 71 IU/ML
THYROID PEROXIDASE$ANTIBODIES: 608 IU/ML

## 2024-11-14 DIAGNOSIS — E66.3 OVERWEIGHT (BMI 25.0-29.9): ICD-10-CM

## 2024-11-14 NOTE — TELEPHONE ENCOUNTER
A refill request was received for:  Requested Prescriptions     Pending Prescriptions Disp Refills    Phentermine HCl 37.5 MG Oral Tab 90 tablet 0     Sig: Take 1 tablet (37.5 mg total) by mouth every morning before breakfast.     Last refill date:  7/11/24   Qty: 90 and 0   Dx: Elevated BMI  Last office visit: 7/11/24   When is follow up due: 10/11/24        Future Appointments   Date Time Provider Department Center   12/27/2024  8:30 AM [facility]

## 2024-11-15 RX ORDER — PHENTERMINE HYDROCHLORIDE 37.5 MG/1
37.5 TABLET ORAL
Qty: 90 TABLET | Refills: 0 | Status: SHIPPED | OUTPATIENT
Start: 2024-11-15

## 2024-11-20 ENCOUNTER — TELEMEDICINE (OUTPATIENT)
Dept: FAMILY MEDICINE CLINIC | Facility: CLINIC | Age: 54
End: 2024-11-20
Payer: COMMERCIAL

## 2024-11-20 DIAGNOSIS — E03.8 SUBCLINICAL HYPOTHYROIDISM: ICD-10-CM

## 2024-11-20 DIAGNOSIS — E66.3 OVERWEIGHT (BMI 25.0-29.9): Primary | ICD-10-CM

## 2024-11-20 PROCEDURE — 99213 OFFICE O/P EST LOW 20 MIN: CPT | Performed by: NURSE PRACTITIONER

## 2024-11-20 RX ORDER — LEVOTHYROXINE SODIUM 25 UG/1
25 TABLET ORAL
Qty: 90 TABLET | Refills: 0 | Status: SHIPPED | OUTPATIENT
Start: 2024-11-20

## 2024-11-20 NOTE — PROGRESS NOTES
Due to the real risk of possible exposure to Coronavirus (CoV-2, COVID-19) in the office/medical building and recommendations for social distancing (key to mitigation/limiting the spread of the virus) a Virtual or Telemedicine visit over the phone was performed as below.     Patient has consented to the Virtual/Telephone Check-In service and expresses understanding and accepts financial responsibility for any deductible, co-insurance and/or co-pays associated with this service.    [facility]  Telehealth Verbal Consent   I conducted a telehealth visit with Sarah Major Kendal today, 11/20/24, which was completed using two-way, real-time interactive audio and video communication. This has been done in good josé miguel to provide continuity of care in the best interest of the provider-patient relationship, due to the COVID -19 public health crisis/national emergency where restrictions of face-to-face office visits are ongoing. Every conscious effort was taken to allow for sufficient and adequate time to complete the visit.  The patient was made aware of the limitations of the telehealth visit, including treatment limitations as no physical exam could be performed.  The patient was advised to call 911 or to go to the ER in case there was an emergency.  The patient was also advised of the potential privacy & security concerns related to the telehealth platform.   The patient was made aware of where to find [facility]'s notice of privacy practices, telehealth consent form and other related consent forms and documents.  which are located on the [facility] website. The patient verbally agreed to telehealth consent form, related consents and the risks discussed.    Lastly, the patient confirmed that they were in [place].   Included in this visit, time may have been spent reviewing labs, medications, radiology tests and decision making. Appropriate medical decision-making and tests are ordered as detailed in the plan of care  above.  Coding/billing information is submitted for this visit based on complexity of care and/or time spent for the visit.    HPI:  Chief Complaint   Patient presents with    Follow - Up     Medications       Sarah Edmonds is a 54 year old female who calls for complaints of:    Follow-up. Taking phentermine regularly and has lost about 15 lbs since 2024. Tolerating this well without side effects. Had thyroid US at [facility] which showed heterogeneous thyroid per patient. Seeing rheumatology in December. Has finished breast radiation.    ROS:  Negative except as above.    Physical Exam:  GEN:  Patient is alert, awake and oriented, well developed, well nourished.  LUNGS: Patient speaks clearly in full sentences without dyspnea, no cough while on video visit.  PSYCH: Appropriate mood and affect.    Allergies[1]  Current Outpatient Medications   Medication Sig Dispense Refill    Phentermine HCl 37.5 MG Oral Tab Take 1 tablet (37.5 mg total) by mouth every morning before breakfast. 90 tablet 0    LEVOTHYROXINE 25 MCG Oral Tab TAKE 1 TABLET BY MOUTH BEFORE BREAKFAST. 90 tablet 0    PANTOPRAZOLE 40 MG Oral Tab EC TAKE 1 TABLET BY MOUTH BEFORE BREAKFAST 90 tablet 0    traMADol 50 MG Oral Tab Take 1 tablet (50 mg total) by mouth every 8 (eight) hours as needed for Pain. 16 tablet 0     Past Medical History:    Atypical chest pain    BRCA1 negative    BRCA2 negative    Breast cancer (HCC)    Right breast IDC    Gastroesophageal reflux disease    Hx of motion sickness    PAC (premature atrial contraction)    Tinnitus    Unspecified abdominal pain    Visual impairment    glasses/contacts     Past Surgical History:   Procedure Laterality Date    Benign biopsy left Left 2019    Benign biopsy left Left 2020    us bx          x3    Excise excess skin tissue,other N/A 12/15/2015    Procedure: LABIAL ADHESIONS TAKE DOWN / LABIALPLASTY;  Surgeon: Sushant Palafox MD;  [place] [facility] [place]     Hysteroscopy,with endometrial N/A 12/15/2015    Procedure: HYSTEROSCOPY WITH ENDOMETRIAL ABLATION;  Surgeon: Sushant Palafox MD;  [facility]: [facility]    Lumpectomy right Right 10/2019    IDC    Other surgical history  10/16/2019    Right breast lumpectomy, sln bx    Radiation right Right 12/2019    Tubal ligation           ASSESSMENT AND PLAN:   1. Patient is a 54 year old female who calls for: Follow-up    Additional Assessment and Plan:  1. Overweight (BMI 25.0-29.9)  -Tolerating phentermine well and losing weight appropriately per patient. Continue same. S/S to report reviewed.     2. Subclinical hypothyroidism  -Stable, CPM. Follow-up 6 months.        Follow up with me 6 months    Call and/or go to the ER if worsening symptoms including, but not limited to: respiratory distress, shortness of breath and  wheezing, worsening fever, cough and mental status.      The patient (or patient's parent if <17 y/o) indicates understanding of the above recommendations and agrees to the above plan.    No orders of the defined types were placed in this encounter.      Meds & Refills for this Visit:  Requested Prescriptions      No prescriptions requested or ordered in this encounter       Imaging & Consults:  None    SHELLY Jones     Time spent during encounter: 15 minutes             [1] No Known Allergies

## 2024-12-23 DIAGNOSIS — K21.9 GASTROESOPHAGEAL REFLUX DISEASE, UNSPECIFIED WHETHER ESOPHAGITIS PRESENT: ICD-10-CM

## 2024-12-23 RX ORDER — PANTOPRAZOLE SODIUM 40 MG/1
40 TABLET, DELAYED RELEASE ORAL
Qty: 90 TABLET | Refills: 0 | Status: SHIPPED | OUTPATIENT
Start: 2024-12-23

## 2025-02-27 DIAGNOSIS — E66.3 OVERWEIGHT (BMI 25.0-29.9): ICD-10-CM

## 2025-02-27 RX ORDER — PHENTERMINE HYDROCHLORIDE 37.5 MG/1
37.5 TABLET ORAL
Qty: 90 TABLET | Refills: 0 | Status: SHIPPED | OUTPATIENT
Start: 2025-02-27

## 2025-02-27 NOTE — TELEPHONE ENCOUNTER
A refill request was received for:  Requested Prescriptions     Pending Prescriptions Disp Refills    PHENTERMINE HCL 37.5 MG Oral Tab [Pharmacy Med Name: PHENTERMINE 37.5 MG TABLET] 90 tablet 0     Sig: TAKE 1 TABLET BY MOUTH EVERY MORNING BEFORE BREAKFAST     Last refill date:  11/15/24   Qty: 90 and 0   Dx: weight management   Last office visit: 11/20/24   When is follow up due: 5/20/25

## 2025-03-20 DIAGNOSIS — K21.9 GASTROESOPHAGEAL REFLUX DISEASE, UNSPECIFIED WHETHER ESOPHAGITIS PRESENT: ICD-10-CM

## 2025-03-20 RX ORDER — PANTOPRAZOLE SODIUM 40 MG/1
40 TABLET, DELAYED RELEASE ORAL
Qty: 90 TABLET | Refills: 0 | Status: SHIPPED | OUTPATIENT
Start: 2025-03-20

## 2025-03-20 NOTE — TELEPHONE ENCOUNTER
A refill request was received for:  Requested Prescriptions     Pending Prescriptions Disp Refills    PANTOPRAZOLE 40 MG Oral Tab EC [Pharmacy Med Name: PANTOPRAZOLE SOD DR 40 MG TAB] 90 tablet 0     Sig: TAKE 1 TABLET BY MOUTH BEFORE BREAKFAST     Last refill date:  12/23/24   Qty: 90 and 0   Dx: reflux   Last office visit:  11/20/24   When is follow up due: 5/20/25

## 2025-03-30 DIAGNOSIS — E03.8 SUBCLINICAL HYPOTHYROIDISM: ICD-10-CM

## 2025-03-31 RX ORDER — LEVOTHYROXINE SODIUM 25 UG/1
25 TABLET ORAL
Qty: 90 TABLET | Refills: 0 | Status: SHIPPED | OUTPATIENT
Start: 2025-03-31

## 2025-04-24 ENCOUNTER — OFFICE VISIT (OUTPATIENT)
Dept: FAMILY MEDICINE CLINIC | Facility: CLINIC | Age: 55
End: 2025-04-24
Payer: COMMERCIAL

## 2025-04-24 VITALS
SYSTOLIC BLOOD PRESSURE: 112 MMHG | HEART RATE: 79 BPM | WEIGHT: 152.38 LBS | BODY MASS INDEX: 28.04 KG/M2 | OXYGEN SATURATION: 99 % | HEIGHT: 62 IN | DIASTOLIC BLOOD PRESSURE: 70 MMHG | TEMPERATURE: 98 F

## 2025-04-24 DIAGNOSIS — R35.0 URINARY FREQUENCY: ICD-10-CM

## 2025-04-24 DIAGNOSIS — Z01.818 PREOP EXAMINATION: Primary | ICD-10-CM

## 2025-04-24 DIAGNOSIS — E03.9 ACQUIRED HYPOTHYROIDISM: ICD-10-CM

## 2025-04-24 DIAGNOSIS — R93.89 ENDOMETRIAL THICKENING ON ULTRASOUND: ICD-10-CM

## 2025-04-24 LAB
APPEARANCE: CLEAR
BILIRUBIN: NEGATIVE
GLUCOSE (URINE DIPSTICK): NEGATIVE MG/DL
KETONES (URINE DIPSTICK): NEGATIVE MG/DL
LEUKOCYTES: NEGATIVE
MULTISTIX LOT#: ABNORMAL NUMERIC
NITRITE, URINE: NEGATIVE
PH, URINE: 7 (ref 4.5–8)
PROTEIN (URINE DIPSTICK): NEGATIVE MG/DL
SPECIFIC GRAVITY: 1.01 (ref 1–1.03)
URINE-COLOR: YELLOW
UROBILINOGEN,SEMI-QN: 0.2 MG/DL (ref 0–1.9)

## 2025-04-24 NOTE — H&P
HPI:     Chief Complaint   Patient presents with    Pre-Op Exam     Surgery 5/8/2025       Sarah Edmonds is a 55 year old female who presents for a pre-operative physical exam.  Patient is to have D&C, to be done by Dr. Antunez at the provider's office on 5/8/2025 for endometrial thickening on ultrasound.     Patient complains of: pelvic pressure, urinary frequency x 1 month. No fever or chills, denies dysuria. Having irregular menstrual bleeding.     Pt has had previous anesthesia:  Yes.  Previous complications:  No  Hx of DVT/PE: No    Patient denies chest pain, dyspnea, headache, dizziness, syncope or near-syncope, palpitations, easy fatigability. Exercises 3x/week. Never smoker.    See Past Medical and Surgical History and ROS for other pertinent complaints and concerns      Current Medications[1]   Allergies: Allergies[2]   Past Medical History[3]   Past Surgical History[4]   Family History[5]   Social History:   Short Social Hx on File[6]      REVIEW OF SYSTEMS:   Pertinent positives and negatives noted in the the HPI. Specifically:  GEN:  No fever or fatigue  HEAD:  No headaches, no dizziness  EYES:  No vision change or complaints  EARS:  No hearing loss, no ear pain  MOUTH/THROAT:  No sore throat or dental problems, no oral lesions  HEART:  No chest pain or palpitations  LUNG:  No SOB, cough or wheeze  GI:  No abdominal pain.  No N/V/D/C  :  No dysuria  MS:  No  joint pain or swelling B  NEURO:  Denies numbness or tingling or weakness  PSYCH:  No mood concerns, denies SHIP  SKIN: no rash, no skin lesions or changing moles    EXAM:   /70 (BP Location: Right arm, Patient Position: Sitting, Cuff Size: adult)   Pulse 79   Temp 98.2 °F (36.8 °C) (Oral)   Ht 5' 2\" (1.575 m)   Wt 152 lb 6.4 oz (69.1 kg)   SpO2 99%   BMI 27.87 kg/m²  Body mass index is 27.87 kg/m².  GENERAL: well developed, well nourished, female  in no apparent distress  SKIN: no suspicious lesions,  skin color wnl  HEENT:  atraumatic, normocephalic, nose and throat are clear;dentition good, EARS: canals clear, TM wnl B  EYES:PERRLA, EOMI,conjunctiva are clear, no discharge  NECK: supple, no lymphadenopathy, no TM  LUNGS: good BS B, clear to auscultation B, no wheezing and no rhonchi B   CARDIO: RRR without murmur, NL S1 S2, no S3 S4  EXT: no edema, radial and DP pulses WNL B UE/LE  GI: NABS, soft, nodistended,no masses, no HSM or tenderness  MS: grossly NL B UE/LE, no significant joint deformities, FROM B UE/LE  PSYCH: mood and affect WNL, speech clear, mood and thought congruent  NEURO: A&O x 3, CNII-XII intact B, motor and sensory grossly intact B UE/LE, DTR's 2/4 B LE, gait normal    ASSESSMENT AND PLAN:   Sarah Edmonds is a 55 year old female who presents for a Pre-Operative Physical Exam.     Patient will have D&C with Dr. Antunez on 5/8/2025 at provider's office for endometrial thickening on ultrasound.    Patient is in good health and has no significant history of cardiac or pulmonary conditions and is therefore at low risk for cardiac and pulmonary complications from the above surgery.    Patient is an acceptable surgical candidate and is medically cleared for surgery.    Send/FAX results to: Patient to message with fax number.    Additional Assessment and Plan:  1. Preop examination  -As above.    2. Endometrial thickening on ultrasound  -As above.    3. Acquired hypothyroidism  -Will repeat TSH now.  - TSH and Free T4 [E]    4. Urinary frequency  -UA in office negative. Shows moderate blood, patient reports vaginal bleeding.  - URINALYSIS, AUTO, W/O SCOPE      The patient indicates understanding of the above recommendations and agrees to the above plan.  Follow up: as above    Orders Placed This Encounter   Procedures    URINALYSIS, AUTO, W/O SCOPE    TSH and Free T4 [E]       Meds & Refills for this Visit:  Requested Prescriptions      No prescriptions requested or ordered in this encounter       Imaging &  Consults:  None    SHELLY Jones           [1]   Current Outpatient Medications   Medication Sig Dispense Refill    LEVOTHYROXINE 25 MCG Oral Tab TAKE 1 TABLET BY MOUTH BEFORE BREAKFAST. 90 tablet 0    PANTOPRAZOLE 40 MG Oral Tab EC TAKE 1 TABLET BY MOUTH BEFORE BREAKFAST 90 tablet 0    PHENTERMINE HCL 37.5 MG Oral Tab TAKE 1 TABLET BY MOUTH EVERY MORNING BEFORE BREAKFAST 90 tablet 0   [2] No Known Allergies  [3]   Past Medical History:   Atypical chest pain    BRCA1 negative    BRCA2 negative    Breast cancer (HCC)    Right breast IDC    Gastroesophageal reflux disease    Hx of motion sickness    PAC (premature atrial contraction)    Tinnitus    Unspecified abdominal pain    Visual impairment    glasses/contacts   [4]   Past Surgical History:  Procedure Laterality Date    Benign biopsy left Left 2019    Benign biopsy left Left 2020    us bx          x3    Excise excess skin tissue,other N/A 12/15/2015    Procedure: LABIAL ADHESIONS TAKE DOWN / LABIALPLASTY;  Surgeon: Sushant Palafox MD;  [facility] [place] [facility]    Hysteroscopy,with endometrial N/A 12/15/2015    Procedure: HYSTEROSCOPY WITH ENDOMETRIAL ABLATION;  Surgeon: Sushant Palafox MD;  Location: [facility]    Lumpectomy right Right 10/2019    IDC    Other surgical history  10/16/2019    Right breast lumpectomy, sln bx    Radiation right Right 2019    Tubal ligation     [5]   Family History  Problem Relation Age of Onset    Cancer Father     Cancer Mother     Breast Cancer Mother 52        age of dx 52    Breast Cancer Paternal Aunt 60        age of dx 60's   [6]   Social History  Socioeconomic History    Marital status:    Tobacco Use    Smoking status: Never    Smokeless tobacco: Never   Vaping Use    Vaping status: Never Used   Substance and Sexual Activity    Alcohol use: Yes     Comment: occasional    Drug use: No     Social Drivers of Health     Food Insecurity: No Food Insecurity  (4/24/2025)    NCSS - Food Insecurity     Worried About Running Out of Food in the Last Year: No     Ran Out of Food in the Last Year: No   Transportation Needs: No Transportation Needs (4/24/2025)    NCSS - Transportation     Lack of Transportation: No   Housing Stability: Not At Risk (4/24/2025)    NCSS - Housing/Utilities     Has Housing: Yes     Worried About Losing Housing: No     Unable to Get Utilities: No

## 2025-05-15 ENCOUNTER — TELEMEDICINE (OUTPATIENT)
Dept: FAMILY MEDICINE CLINIC | Facility: CLINIC | Age: 55
End: 2025-05-15
Payer: COMMERCIAL

## 2025-05-15 DIAGNOSIS — E66.3 OVERWEIGHT (BMI 25.0-29.9): Primary | ICD-10-CM

## 2025-05-15 PROCEDURE — 99213 OFFICE O/P EST LOW 20 MIN: CPT | Performed by: NURSE PRACTITIONER

## 2025-05-15 RX ORDER — ANASTROZOLE 1 MG/1
1 TABLET ORAL DAILY
COMMUNITY

## 2025-05-15 NOTE — PROGRESS NOTES
Due to the real risk of possible exposure to Coronavirus (CoV-2, COVID-19) in the office/medical building and recommendations for social distancing (key to mitigation/limiting the spread of the virus) a Virtual or Telemedicine visit over the phone was performed as below.     Patient has consented to the Virtual/Telephone Check-In service and expresses understanding and accepts financial responsibility for any deductible, co-insurance and/or co-pays associated with this service.    [facility]  Telehealth Verbal Consent   I conducted a telehealth visit with Sarah Major Kendal today, 05/15/25, which was completed using two-way, real-time interactive audio and video communication. This has been done in good josé miguel to provide continuity of care in the best interest of the provider-patient relationship, due to the COVID -19 public health crisis/national emergency where restrictions of face-to-face office visits are ongoing. Every conscious effort was taken to allow for sufficient and adequate time to complete the visit.  The patient was made aware of the limitations of the telehealth visit, including treatment limitations as no physical exam could be performed.  The patient was advised to call 911 or to go to the ER in case there was an emergency.  The patient was also advised of the potential privacy & security concerns related to the telehealth platform.   The patient was made aware of where to find [facility]'s notice of privacy practices, telehealth consent form and other related consent forms and documents.  which are located on the [facility] website. The patient verbally agreed to telehealth consent form, related consents and the risks discussed.    Lastly, the patient confirmed that they were in [place].   Included in this visit, time may have been spent reviewing labs, medications, radiology tests and decision making. Appropriate medical decision-making and tests are ordered as detailed in the plan of care  above.  Coding/billing information is submitted for this visit based on complexity of care and/or time spent for the visit.    HPI:  Chief Complaint   Patient presents with    Follow - Up     Weight loss medications       Sarah Edmonds is a 55 year old female who calls for complaints of:    Discussion about weight loss. She has been taking phentermine and this used to work well for her, but now she feels like it is not helping. Interested in other options. Admits diet and exercise could further improve. Has had some health stressors lately which she thinks has contributed to weight gain.     ROS:  Negative except as above.    Physical Exam:  GEN:  Patient is alert, awake and oriented, well developed, well nourished.  LUNGS: Patient speaks clearly in full sentences without dyspnea, no cough while on video visit.  PSYCH: Appropriate mood and affect.    Allergies[1]  Current Medications[2]  Past Medical History[3]  Past Surgical History[4]      ASSESSMENT AND PLAN:   1. Patient is a 55 year old female who calls for: Weight loss medication discussion    Additional Assessment and Plan:  1. Overweight (BMI 25.0-29.9)  -Discussed possible options for weight management medications including Vyvanse, bupropion, topiramate, naltrexone and various combinations of these medicines. Discussed that insurance may not cover some of these medicines as BMI <30. Reviewed possible side effects and that some of these uses are off-label. She would not qualify for injectable GLP-1 based on BMI<30. She would like to think about these medications and will let me know next week if she wants to change current treatment.         Follow up with me 3 months    Call and/or go to the ER if worsening symptoms including, but not limited to: respiratory distress, shortness of breath and  wheezing, worsening fever, cough and mental status.      The patient (or patient's parent if <19 y/o) indicates understanding of the above recommendations and  agrees to the above plan.    No orders of the defined types were placed in this encounter.      Meds & Refills for this Visit:  Requested Prescriptions      No prescriptions requested or ordered in this encounter       Imaging & Consults:  None    SHELLY Jones     Time spent during encounter: 20 minutes               [1] No Known Allergies  [2]   Current Outpatient Medications   Medication Sig Dispense Refill    anastrozole 1 MG Oral Tab tab Take 1 tablet (1 mg total) by mouth daily.      LEVOTHYROXINE 25 MCG Oral Tab TAKE 1 TABLET BY MOUTH BEFORE BREAKFAST. 90 tablet 0    PANTOPRAZOLE 40 MG Oral Tab EC TAKE 1 TABLET BY MOUTH BEFORE BREAKFAST 90 tablet 0    PHENTERMINE HCL 37.5 MG Oral Tab TAKE 1 TABLET BY MOUTH EVERY MORNING BEFORE BREAKFAST 90 tablet 0   [3]   Past Medical History:   Atypical chest pain    BRCA1 negative    BRCA2 negative    Breast cancer (HCC)    Right breast IDC    Gastroesophageal reflux disease    Hx of motion sickness    PAC (premature atrial contraction)    Tinnitus    Unspecified abdominal pain    Visual impairment    glasses/contacts   [4]   Past Surgical History:  Procedure Laterality Date    Benign biopsy left Left 2019    Benign biopsy left Left 2020    us bx          x3    Excise excess skin tissue,other N/A 12/15/2015    Procedure: LABIAL ADHESIONS TAKE DOWN / LABIALPLASTY;  Surgeon: Sushant Palafox MD;  Location: [facility]    Hysteroscopy,with endometrial N/A 12/15/2015    Procedure: HYSTEROSCOPY WITH ENDOMETRIAL ABLATION;  Surgeon: Sushant Palafox MD;  Location: [facility]    Lumpectomy right Right 10/2019    IDC    Other surgical history  10/16/2019    Right breast lumpectomy, sln bx    Radiation right Right 2019    Tubal ligation

## 2025-07-01 ENCOUNTER — NURSE TRIAGE (OUTPATIENT)
Dept: FAMILY MEDICINE CLINIC | Facility: CLINIC | Age: 55
End: 2025-07-01

## 2025-07-01 ENCOUNTER — PATIENT MESSAGE (OUTPATIENT)
Dept: FAMILY MEDICINE CLINIC | Facility: CLINIC | Age: 55
End: 2025-07-01

## 2025-07-01 DIAGNOSIS — B37.9 YEAST INFECTION: Primary | ICD-10-CM

## 2025-07-01 DIAGNOSIS — E03.8 SUBCLINICAL HYPOTHYROIDISM: ICD-10-CM

## 2025-07-01 RX ORDER — FLUCONAZOLE 150 MG/1
TABLET ORAL
Qty: 2 TABLET | Refills: 0 | Status: SHIPPED | OUTPATIENT
Start: 2025-07-01

## 2025-07-01 NOTE — TELEPHONE ENCOUNTER
Please reply to pool: EM RN TRIAGE  Action Requested: Summary for Provider     []  Critical Lab, Recommendations Needed  [] Need Additional Advice  []   FYI    []   Need Orders  [x] Need Medications Sent to Pharmacy  []  Other     SUMMARY: Patient contacted regarding her [employer] message.  She is being treated with amoxicillin 875 mg for oral surgery, has another procedure today at 3.  Has another week to go on this prescription and may remain on antibiotics after her procedure today.  Reports vaginal itching, burning and tenderness.  Thick, white discharge.  Denies vaginal bleeding, pelvic pain or fever.  Urination is uncomfortable due to irritation of vulvar tissue.  States she frequently gets yeast infections from antibiotics.  Does not do well with over the counter preps.  Requesting prescription for diflucan.  Please advise.      Reason for call: Vaginal Problem  Onset: Data Unavailable                       Reason for Disposition   Symptoms of a yeast infection (i.e., itchy, white discharge, not bad smelling) and not improved > 3 days following Care Advice    Protocols used: Vaginal Symptoms-A-[place]

## 2025-07-03 RX ORDER — LEVOTHYROXINE SODIUM 25 UG/1
25 TABLET ORAL
Qty: 90 TABLET | Refills: 3 | Status: SHIPPED | OUTPATIENT
Start: 2025-07-03

## 2025-07-03 NOTE — TELEPHONE ENCOUNTER
Refill passed per [facility] protocols.    Requested Prescriptions   Pending Prescriptions Disp Refills    LEVOTHYROXINE 25 MCG Oral Tab [Pharmacy Med Name: LEVOTHYROXINE 25 MCG TABLET] 90 tablet 3     Sig: TAKE 1 TABLET BY MOUTH BEFORE BREAKFAST.       Thyroid Medication Protocol Passed - 7/3/2025 10:44 AM

## 2025-07-11 DIAGNOSIS — E66.3 OVERWEIGHT (BMI 25.0-29.9): ICD-10-CM

## 2025-07-14 DIAGNOSIS — K21.9 GASTROESOPHAGEAL REFLUX DISEASE, UNSPECIFIED WHETHER ESOPHAGITIS PRESENT: ICD-10-CM

## 2025-07-14 RX ORDER — PHENTERMINE HYDROCHLORIDE 37.5 MG/1
37.5 TABLET ORAL
Qty: 90 TABLET | Refills: 0 | Status: SHIPPED | OUTPATIENT
Start: 2025-07-14

## 2025-07-14 NOTE — TELEPHONE ENCOUNTER
03/12/2025  LAST WRITTEN: 02/27/2025  Quantity: 90  Recent Visits  Date Type Provider Dept   04/24/25 Office Visit Tami Hdz APRN [facility] [place]   07/11/24 Office Visit Tami Hdz APRN [facility] [place]

## 2025-07-18 RX ORDER — PANTOPRAZOLE SODIUM 40 MG/1
40 TABLET, DELAYED RELEASE ORAL
Qty: 90 TABLET | Refills: 3 | Status: SHIPPED | OUTPATIENT
Start: 2025-07-18

## 2025-07-18 NOTE — TELEPHONE ENCOUNTER
Refill Per Protocol     Requested Prescriptions   Pending Prescriptions Disp Refills    PANTOPRAZOLE 40 MG Oral Tab EC [Pharmacy Med Name: PANTOPRAZOLE SOD DR 40 MG TAB] 90 tablet 0     Sig: TAKE 1 TABLET BY MOUTH BEFORE BREAKFAST       Gastrointestional Medication Protocol Passed - 7/18/2025  7:55 AM        Passed - In person appointment or virtual visit in the past 12 mos or appointment in next 3 mos     Recent Outpatient Visits              2 months ago Overweight (BMI 25.0-29.9)    [facility] [place]    Telemedicine    2 months ago Preop examination    [facility] [place] [facility], APRN    Office Visit    8 months ago Overweight (BMI 25.0-29.9)    [facility] [place] [facility] Leslie, APRN    Telemedicine    1 year ago Pre-op exam    [facility] [place] [facility] Leslie, APRN    Office Visit    1 year ago Subacute cough    [facility] [place] [facility] Leslie, APRN    Office Visit          Future Appointments         Provider Department Appt Notes    In 1 month Mylene Bowie MD [facility], [address], [place] tingling hands all day worse in the morning, struggling to open and  things, has been going on for a while, discuss results Policy advised                    Passed - Medication is active on med list

## 2025-08-20 ENCOUNTER — OFFICE VISIT (OUTPATIENT)
Dept: RHEUMATOLOGY | Facility: CLINIC | Age: 55
End: 2025-08-20

## 2025-08-20 ENCOUNTER — HOSPITAL ENCOUNTER (OUTPATIENT)
Dept: GENERAL RADIOLOGY | Age: 55
Discharge: HOME | End: 2025-08-20
Attending: INTERNAL MEDICINE

## 2025-08-20 ENCOUNTER — LAB ENCOUNTER (OUTPATIENT)
Dept: LAB | Age: 55
End: 2025-08-20
Attending: INTERNAL MEDICINE

## 2025-08-20 VITALS
DIASTOLIC BLOOD PRESSURE: 70 MMHG | SYSTOLIC BLOOD PRESSURE: 112 MMHG | HEART RATE: 77 BPM | BODY MASS INDEX: 28 KG/M2 | WEIGHT: 155 LBS

## 2025-08-20 DIAGNOSIS — R76.8 POSITIVE ANA (ANTINUCLEAR ANTIBODY): ICD-10-CM

## 2025-08-20 DIAGNOSIS — M79.641 PAIN IN BOTH HANDS: ICD-10-CM

## 2025-08-20 DIAGNOSIS — M25.50 ARTHRALGIA, UNSPECIFIED JOINT: ICD-10-CM

## 2025-08-20 DIAGNOSIS — M79.641 PAIN IN BOTH HANDS: Primary | ICD-10-CM

## 2025-08-20 DIAGNOSIS — M79.642 PAIN IN BOTH HANDS: ICD-10-CM

## 2025-08-20 DIAGNOSIS — M79.642 PAIN IN BOTH HANDS: Primary | ICD-10-CM

## 2025-08-20 LAB
ERYTHROCYTE [SEDIMENTATION RATE] IN BLOOD: 7 MM/HR (ref 0–30)
RHEUMATOID FACT SERPL-ACNC: 4.3 IU/ML (ref ?–14)

## 2025-08-20 PROCEDURE — 86039 ANTINUCLEAR ANTIBODIES (ANA): CPT

## 2025-08-20 PROCEDURE — 86225 DNA ANTIBODY NATIVE: CPT

## 2025-08-20 PROCEDURE — 85652 RBC SED RATE AUTOMATED: CPT | Performed by: INTERNAL MEDICINE

## 2025-08-20 PROCEDURE — 86200 CCP ANTIBODY: CPT | Performed by: INTERNAL MEDICINE

## 2025-08-20 PROCEDURE — 86235 NUCLEAR ANTIGEN ANTIBODY: CPT

## 2025-08-20 PROCEDURE — 86038 ANTINUCLEAR ANTIBODIES: CPT

## 2025-08-20 PROCEDURE — 86431 RHEUMATOID FACTOR QUANT: CPT | Performed by: INTERNAL MEDICINE

## 2025-08-20 PROCEDURE — 99204 OFFICE O/P NEW MOD 45 MIN: CPT | Performed by: INTERNAL MEDICINE

## 2025-08-20 PROCEDURE — 36415 COLL VENOUS BLD VENIPUNCTURE: CPT | Performed by: INTERNAL MEDICINE

## 2025-08-20 PROCEDURE — 73130 X-RAY EXAM OF HAND: CPT | Performed by: INTERNAL MEDICINE

## 2025-08-21 LAB — CCP IGG SERPL-ACNC: 1.4 U/ML (ref 0–6.9)

## 2025-08-22 LAB
ANA NUCLEOLAR TITR SER IF: 1280
DSDNA AB TITR SER: <10
NUCLEAR IGG TITR SER IF: POSITIVE

## 2025-08-25 LAB
CENTROMERE IGG SER-ACNC: 7.9 U/ML (ref ?–7)
ENA JO1 AB SER IA-ACNC: <0.3 U/ML (ref ?–7)
ENA RNP IGG SER IA-ACNC: 2.3 U/ML (ref ?–7)
ENA SCL70 IGG SER IA-ACNC: 1.2 U/ML (ref ?–7)
ENA SM IGG SER IA-ACNC: <0.7 U/ML (ref ?–7)
ENA SS-A IGG SER IA-ACNC: <0.4 U/ML (ref ?–7)
ENA SS-B IGG SER IA-ACNC: <0.4 U/ML (ref ?–7)
U1 SNRNP IGG SER IA-ACNC: 1.6 U/ML (ref ?–5)

## (undated) DEVICE — GAMMEX® NON-LATEX PI ORTHO SIZE 8.5, STERILE POLYISOPRENE POWDER-FREE SURGICAL GLOVE: Brand: GAMMEX

## (undated) DEVICE — PAD SACRAL SPAN AID

## (undated) DEVICE — GLOVE SUR 6 SENSICARE PI PIP CRM PWD F

## (undated) DEVICE — SUT PERMA- 0 18IN FSL NABSRB BLK L30MM 3/8

## (undated) DEVICE — SUTURE VICRYL 3-0 SH

## (undated) DEVICE — ANTIBACTERIAL UNDYED BRAIDED (POLYGLACTIN 910), SYNTHETIC ABSORBABLE SUTURE: Brand: COATED VICRYL

## (undated) DEVICE — Device: Brand: PLUMEPEN

## (undated) DEVICE — GOWN,SIRUS,FABRIC-REINFORCED,X-LARGE: Brand: MEDLINE

## (undated) DEVICE — VIOLET BRAIDED (POLYGLACTIN 910), SYNTHETIC ABSORBABLE SUTURE: Brand: COATED VICRYL

## (undated) DEVICE — Device

## (undated) DEVICE — STERILE (15.2 X 244CM) POLYETHYLENE TELESCOPICALLY-FOLDED COVER WITH ATTACHED (3CM) NEOGUARD™ TIP: Brand: SURGI-TIP TRANSDUCER COVER

## (undated) DEVICE — PROVE COVER: Brand: UNBRANDED

## (undated) DEVICE — CONT SPEC SURG FAXITRON WEDGE

## (undated) DEVICE — BREAST-HERNIA-PORT CDS-LF: Brand: MEDLINE INDUSTRIES, INC.

## (undated) DEVICE — BRA SURGICAL ELIZABETH PINK L

## (undated) DEVICE — SOL  .9 1000ML BTL

## (undated) DEVICE — APPLICATOR PREP 10.5ML ORNG CHG 2% ISO ALC

## (undated) DEVICE — UNDERPAD INCONT 23X36IN STD BLU BACKSHEET

## (undated) DEVICE — UNDYED BRAIDED (POLYGLACTIN 910), SYNTHETIC ABSORBABLE SUTURE: Brand: COATED VICRYL

## (undated) DEVICE — SUTURE SILK 2-0 FSL

## (undated) DEVICE — ELECTRODE ES L10.2CM BLDE L4IN EXT MPLR OPN

## (undated) DEVICE — AGENT HEMSTAT 4X4IN OXIDIZED REGENERATED

## (undated) DEVICE — SLEEVE COMPR MD KNEE LEN SGL USE KENDALL SCD

## (undated) DEVICE — KENDALL SCD EXPRESS SLEEVES, KNEE LENGTH, MEDIUM: Brand: KENDALL SCD

## (undated) DEVICE — SUT ETHLN 3-0 18IN PS-1 NABSRB BLK 24MM 3/8 C

## (undated) DEVICE — SHEET,DRAPE,40X58,STERILE: Brand: MEDLINE